# Patient Record
Sex: FEMALE | Race: WHITE | Employment: FULL TIME | URBAN - METROPOLITAN AREA
[De-identification: names, ages, dates, MRNs, and addresses within clinical notes are randomized per-mention and may not be internally consistent; named-entity substitution may affect disease eponyms.]

---

## 2017-01-19 ENCOUNTER — HOSPITAL ENCOUNTER (OUTPATIENT)
Dept: VASCULAR LAB | Age: 47
Discharge: HOME | End: 2017-01-19
Attending: INTERNAL MEDICINE | Admitting: INTERNAL MEDICINE

## 2017-01-19 DIAGNOSIS — I82.621 ACUTE DEEP VEIN THROMBOSIS (DVT) OF RIGHT UPPER EXTREMITY, UNSPECIFIED VEIN (HCC): Primary | ICD-10-CM

## 2017-01-19 DIAGNOSIS — I82.621 ACUTE EMBOLISM AND THROMBOSIS OF DEEP VEIN OF RIGHT UPPER EXTREMITY (HCC): ICD-10-CM

## 2017-03-24 ENCOUNTER — HOSPITAL ENCOUNTER (OUTPATIENT)
Dept: CT IMAGING | Age: 47
Discharge: HOME | End: 2017-03-24

## 2017-03-24 ENCOUNTER — HOSPITAL ENCOUNTER (OUTPATIENT)
Dept: OTHER | Age: 47
Discharge: HOME | End: 2017-03-24

## 2017-03-24 DIAGNOSIS — J40 BRONCHITIS, NOT SPECIFIED AS ACUTE OR CHRONIC: ICD-10-CM

## 2017-03-24 DIAGNOSIS — R91.8 LUNG MASS: ICD-10-CM

## 2017-03-24 DIAGNOSIS — R91.8 OTHER NONSPECIFIC ABNORMAL FINDING OF LUNG FIELD: ICD-10-CM

## 2017-03-24 DIAGNOSIS — R05.9 COUGH: ICD-10-CM

## 2017-03-24 DIAGNOSIS — R09.89 ABNORMAL CHEST SOUNDS: ICD-10-CM

## 2017-03-27 ENCOUNTER — TELEPHONE (OUTPATIENT)
Dept: PULMONOLOGY | Age: 47
End: 2017-03-27

## 2017-03-29 ENCOUNTER — OFFICE VISIT (OUTPATIENT)
Dept: PULMONOLOGY | Age: 47
End: 2017-03-29

## 2017-03-29 VITALS
HEIGHT: 67 IN | HEART RATE: 85 BPM | WEIGHT: 234 LBS | OXYGEN SATURATION: 96 % | DIASTOLIC BLOOD PRESSURE: 72 MMHG | SYSTOLIC BLOOD PRESSURE: 112 MMHG | BODY MASS INDEX: 36.73 KG/M2 | RESPIRATION RATE: 16 BRPM

## 2017-03-29 DIAGNOSIS — C81.92 HODGKIN LYMPHOMA OF INTRATHORACIC LYMPH NODES, UNSPECIFIED HODGKIN LYMPHOMA TYPE (HCC): ICD-10-CM

## 2017-03-29 DIAGNOSIS — C92.01 ACUTE MYELOID LEUKEMIA IN REMISSION (HCC): ICD-10-CM

## 2017-03-29 DIAGNOSIS — Z94.81 HISTORY OF BONE MARROW TRANSPLANT (HCC): ICD-10-CM

## 2017-03-29 DIAGNOSIS — R91.8 MASS OF UPPER LOBE OF LUNG: Primary | ICD-10-CM

## 2017-03-29 PROCEDURE — 99204 OFFICE O/P NEW MOD 45 MIN: CPT | Performed by: INTERNAL MEDICINE

## 2017-03-29 RX ORDER — AMOXICILLIN AND CLAVULANATE POTASSIUM 875; 125 MG/1; MG/1
TABLET, FILM COATED ORAL
COMMUNITY
Start: 2017-03-22 | End: 2017-04-12

## 2017-03-29 RX ORDER — PREDNISONE 10 MG/1
TABLET ORAL
COMMUNITY
Start: 2017-03-22 | End: 2017-04-12

## 2017-04-03 ENCOUNTER — HOSPITAL ENCOUNTER (OUTPATIENT)
Dept: ENDOSCOPY | Age: 47
Discharge: HOME | End: 2017-04-03
Attending: INTERNAL MEDICINE | Admitting: INTERNAL MEDICINE

## 2017-04-03 VITALS
WEIGHT: 230 LBS | TEMPERATURE: 98.2 F | DIASTOLIC BLOOD PRESSURE: 69 MMHG | HEIGHT: 67 IN | BODY MASS INDEX: 36.1 KG/M2 | SYSTOLIC BLOOD PRESSURE: 115 MMHG | OXYGEN SATURATION: 98 % | RESPIRATION RATE: 16 BRPM | HEART RATE: 91 BPM

## 2017-04-03 PROCEDURE — 31624 DX BRONCHOSCOPE/LAVAGE: CPT | Performed by: INTERNAL MEDICINE

## 2017-04-05 LAB
CULTURE, RESPIRATORY: NORMAL
GRAM STAIN RESULT: NORMAL

## 2017-04-12 ENCOUNTER — OFFICE VISIT (OUTPATIENT)
Dept: PULMONOLOGY | Age: 47
End: 2017-04-12

## 2017-04-12 ENCOUNTER — TELEPHONE (OUTPATIENT)
Dept: PULMONOLOGY | Age: 47
End: 2017-04-12

## 2017-04-12 VITALS
BODY MASS INDEX: 36.88 KG/M2 | WEIGHT: 235 LBS | OXYGEN SATURATION: 96 % | RESPIRATION RATE: 16 BRPM | HEART RATE: 92 BPM | SYSTOLIC BLOOD PRESSURE: 116 MMHG | HEIGHT: 67 IN | DIASTOLIC BLOOD PRESSURE: 78 MMHG

## 2017-04-12 DIAGNOSIS — R91.8 MASS OF UPPER LOBE OF LUNG: Primary | ICD-10-CM

## 2017-04-12 PROCEDURE — 99213 OFFICE O/P EST LOW 20 MIN: CPT | Performed by: INTERNAL MEDICINE

## 2017-04-18 ENCOUNTER — HOSPITAL ENCOUNTER (OUTPATIENT)
Dept: OTHER | Age: 47
Discharge: HOME | End: 2017-04-18
Attending: INTERNAL MEDICINE | Admitting: RADIOLOGY

## 2017-04-18 VITALS
WEIGHT: 230 LBS | OXYGEN SATURATION: 96 % | DIASTOLIC BLOOD PRESSURE: 64 MMHG | TEMPERATURE: 97.6 F | HEART RATE: 100 BPM | BODY MASS INDEX: 36.1 KG/M2 | RESPIRATION RATE: 14 BRPM | SYSTOLIC BLOOD PRESSURE: 99 MMHG | HEIGHT: 67 IN

## 2017-04-18 DIAGNOSIS — R91.8 MASS OF UPPER LOBE OF LUNG: ICD-10-CM

## 2017-04-18 LAB
APTT: 27.9 SEC (ref 21–31.8)
INR BLD: 1.04 (ref 0.85–1.15)
PLATELET # BLD: 278 K/UL (ref 135–450)
PROTHROMBIN TIME: 11.7 SEC (ref 9.6–13)

## 2017-04-18 RX ORDER — MIDAZOLAM HYDROCHLORIDE 1 MG/ML
1 INJECTION INTRAMUSCULAR; INTRAVENOUS EVERY 5 MIN PRN
Status: COMPLETED | OUTPATIENT
Start: 2017-04-18 | End: 2017-04-18

## 2017-04-18 RX ORDER — FENTANYL CITRATE 50 UG/ML
50 INJECTION, SOLUTION INTRAMUSCULAR; INTRAVENOUS EVERY 5 MIN PRN
Status: DISCONTINUED | OUTPATIENT
Start: 2017-04-18 | End: 2017-04-20 | Stop reason: HOSPADM

## 2017-04-18 RX ORDER — ACETAMINOPHEN 325 MG/1
650 TABLET ORAL EVERY 4 HOURS PRN
Status: DISCONTINUED | OUTPATIENT
Start: 2017-04-18 | End: 2017-04-18

## 2017-04-18 RX ORDER — ONDANSETRON 2 MG/ML
4 INJECTION INTRAMUSCULAR; INTRAVENOUS EVERY 8 HOURS PRN
Status: DISCONTINUED | OUTPATIENT
Start: 2017-04-18 | End: 2017-04-18

## 2017-04-18 RX ADMIN — MIDAZOLAM HYDROCHLORIDE 1 MG: 1 INJECTION INTRAMUSCULAR; INTRAVENOUS at 11:50

## 2017-04-18 RX ADMIN — FENTANYL CITRATE 50 MCG: 50 INJECTION, SOLUTION INTRAMUSCULAR; INTRAVENOUS at 12:05

## 2017-04-18 RX ADMIN — MIDAZOLAM HYDROCHLORIDE 1 MG: 1 INJECTION INTRAMUSCULAR; INTRAVENOUS at 11:30

## 2017-04-18 RX ADMIN — FENTANYL CITRATE 25 MCG: 50 INJECTION, SOLUTION INTRAMUSCULAR; INTRAVENOUS at 11:50

## 2017-04-18 RX ADMIN — FENTANYL CITRATE 50 MCG: 50 INJECTION, SOLUTION INTRAMUSCULAR; INTRAVENOUS at 11:30

## 2017-04-18 ASSESSMENT — PAIN DESCRIPTION - FREQUENCY
FREQUENCY: CONTINUOUS
FREQUENCY: INTERMITTENT

## 2017-04-18 ASSESSMENT — PAIN DESCRIPTION - LOCATION
LOCATION: CHEST

## 2017-04-18 ASSESSMENT — PAIN DESCRIPTION - PAIN TYPE
TYPE: ACUTE PAIN

## 2017-04-18 ASSESSMENT — PAIN SCALES - GENERAL
PAINLEVEL_OUTOF10: 5
PAINLEVEL_OUTOF10: 0
PAINLEVEL_OUTOF10: 3
PAINLEVEL_OUTOF10: 3
PAINLEVEL_OUTOF10: 0
PAINLEVEL_OUTOF10: 4
PAINLEVEL_OUTOF10: 6
PAINLEVEL_OUTOF10: 1

## 2017-04-18 ASSESSMENT — PAIN DESCRIPTION - DESCRIPTORS
DESCRIPTORS: ACHING
DESCRIPTORS: DULL
DESCRIPTORS: ACHING;SHARP
DESCRIPTORS: SHARP

## 2017-04-18 ASSESSMENT — PAIN DESCRIPTION - ONSET
ONSET: ON-GOING

## 2017-04-18 ASSESSMENT — PAIN DESCRIPTION - DIRECTION: RADIATING_TOWARDS: UNDER LEFT BREAST

## 2017-04-18 ASSESSMENT — PAIN DESCRIPTION - PROGRESSION
CLINICAL_PROGRESSION: GRADUALLY IMPROVING
CLINICAL_PROGRESSION: NOT CHANGED

## 2017-04-18 ASSESSMENT — PAIN DESCRIPTION - ORIENTATION
ORIENTATION: MID;RIGHT
ORIENTATION: MID;LEFT
ORIENTATION: MID
ORIENTATION: MID;LEFT

## 2017-04-19 PROBLEM — S27.892A MEDIASTINAL HEMATOMA: Status: ACTIVE | Noted: 2017-04-19

## 2017-04-21 LAB
BODY FLUID CULTURE, STERILE: NORMAL
GRAM STAIN RESULT: NORMAL

## 2017-04-23 LAB — ANAEROBIC CULTURE: NORMAL

## 2017-05-08 LAB
FUNGUS (MYCOLOGY) CULTURE: NORMAL
FUNGUS STAIN: NORMAL

## 2017-05-09 ENCOUNTER — TELEPHONE (OUTPATIENT)
Dept: PULMONOLOGY | Age: 47
End: 2017-05-09

## 2017-05-09 DIAGNOSIS — R91.8 LUNG MASS: Primary | ICD-10-CM

## 2017-05-22 LAB
FUNGUS (MYCOLOGY) CULTURE: NORMAL
FUNGUS STAIN: NORMAL

## 2017-05-23 LAB
AFB CULTURE (MYCOBACTERIA): NORMAL
AFB SMEAR: NORMAL

## 2017-05-26 ENCOUNTER — HOSPITAL ENCOUNTER (OUTPATIENT)
Dept: CT IMAGING | Age: 47
Discharge: HOME | End: 2017-05-26
Attending: INTERNAL MEDICINE | Admitting: INTERNAL MEDICINE

## 2017-05-26 DIAGNOSIS — R91.8 LUNG MASS: ICD-10-CM

## 2017-05-26 DIAGNOSIS — R91.8 OTHER NONSPECIFIC ABNORMAL FINDING OF LUNG FIELD: ICD-10-CM

## 2017-06-05 ENCOUNTER — TELEPHONE (OUTPATIENT)
Dept: PULMONOLOGY | Age: 47
End: 2017-06-05

## 2017-06-06 LAB
AFB CULTURE (MYCOBACTERIA): NORMAL
AFB SMEAR: NORMAL

## 2017-06-12 ENCOUNTER — TELEPHONE (OUTPATIENT)
Dept: PULMONOLOGY | Age: 47
End: 2017-06-12

## 2017-06-21 ENCOUNTER — OFFICE VISIT (OUTPATIENT)
Dept: CARDIOTHORACIC SURGERY | Age: 47
End: 2017-06-21

## 2017-06-21 VITALS
BODY MASS INDEX: 36.38 KG/M2 | OXYGEN SATURATION: 96 % | DIASTOLIC BLOOD PRESSURE: 66 MMHG | SYSTOLIC BLOOD PRESSURE: 118 MMHG | TEMPERATURE: 98.3 F | HEART RATE: 96 BPM | WEIGHT: 231.8 LBS | HEIGHT: 67 IN

## 2017-06-21 DIAGNOSIS — R91.8 MASS OF UPPER LOBE OF LEFT LUNG: Primary | ICD-10-CM

## 2017-06-21 DIAGNOSIS — C81.90 HODGKIN'S DISEASE IN REMISSION (HCC): ICD-10-CM

## 2017-06-21 DIAGNOSIS — C93.01 AML M5 (ACUTE MONOCYTIC LEUKEMIA) IN REMISSION (HCC): ICD-10-CM

## 2017-06-21 DIAGNOSIS — E66.9 OBESITY (BMI 35.0-39.9 WITHOUT COMORBIDITY): ICD-10-CM

## 2017-06-21 PROCEDURE — 99244 OFF/OP CNSLTJ NEW/EST MOD 40: CPT | Performed by: THORACIC SURGERY (CARDIOTHORACIC VASCULAR SURGERY)

## 2017-06-21 ASSESSMENT — ENCOUNTER SYMPTOMS
CHEST TIGHTNESS: 0
BACK PAIN: 0
EYE REDNESS: 0
ABDOMINAL DISTENTION: 0
GASTROINTESTINAL NEGATIVE: 1
EYE ITCHING: 0
ABDOMINAL PAIN: 0
COUGH: 1
TROUBLE SWALLOWING: 0
WHEEZING: 0
SHORTNESS OF BREATH: 0
BLOOD IN STOOL: 0
VOICE CHANGE: 0
EYE PAIN: 0

## 2017-06-22 ENCOUNTER — TELEPHONE (OUTPATIENT)
Dept: CARDIOTHORACIC SURGERY | Age: 47
End: 2017-06-22

## 2017-06-30 ENCOUNTER — TELEPHONE (OUTPATIENT)
Dept: CARDIOTHORACIC SURGERY | Age: 47
End: 2017-06-30

## 2017-06-30 ENCOUNTER — TELEPHONE (OUTPATIENT)
Dept: PULMONOLOGY | Age: 47
End: 2017-06-30

## 2017-07-05 ENCOUNTER — HOSPITAL ENCOUNTER (OUTPATIENT)
Dept: CT IMAGING | Age: 47
Discharge: HOME | End: 2017-07-05
Attending: THORACIC SURGERY (CARDIOTHORACIC VASCULAR SURGERY) | Admitting: THORACIC SURGERY (CARDIOTHORACIC VASCULAR SURGERY)

## 2017-07-05 ENCOUNTER — HOSPITAL ENCOUNTER (OUTPATIENT)
Dept: PREADMISSION TESTING | Age: 47
Discharge: HOME | End: 2017-07-05
Attending: THORACIC SURGERY (CARDIOTHORACIC VASCULAR SURGERY) | Admitting: THORACIC SURGERY (CARDIOTHORACIC VASCULAR SURGERY)

## 2017-07-05 ENCOUNTER — TELEPHONE (OUTPATIENT)
Dept: CARDIOTHORACIC SURGERY | Age: 47
End: 2017-07-05

## 2017-07-05 VITALS
OXYGEN SATURATION: 96 % | HEIGHT: 67 IN | TEMPERATURE: 98 F | HEART RATE: 94 BPM | SYSTOLIC BLOOD PRESSURE: 97 MMHG | DIASTOLIC BLOOD PRESSURE: 62 MMHG | WEIGHT: 233 LBS | BODY MASS INDEX: 36.57 KG/M2 | RESPIRATION RATE: 16 BRPM

## 2017-07-05 DIAGNOSIS — R91.8 LUNG MASS: ICD-10-CM

## 2017-07-05 DIAGNOSIS — R91.8 OTHER NONSPECIFIC ABNORMAL FINDING OF LUNG FIELD: ICD-10-CM

## 2017-07-05 LAB
A/G RATIO: 1.3 (ref 1.1–2.2)
ABO/RH: NORMAL
ALBUMIN SERPL-MCNC: 4.2 G/DL (ref 3.4–5)
ALP BLD-CCNC: 105 U/L (ref 40–129)
ALT SERPL-CCNC: 23 U/L (ref 10–40)
AMORPHOUS: ABNORMAL /HPF
ANION GAP SERPL CALCULATED.3IONS-SCNC: 11 MMOL/L (ref 3–16)
ANTIBODY SCREEN: NORMAL
APTT: 29.7 SEC (ref 21–31.8)
AST SERPL-CCNC: 27 U/L (ref 15–37)
BILIRUB SERPL-MCNC: <0.2 MG/DL (ref 0–1)
BILIRUBIN URINE: NEGATIVE
BLOOD, URINE: NEGATIVE
BUN BLDV-MCNC: 12 MG/DL (ref 7–20)
CALCIUM SERPL-MCNC: 9.5 MG/DL (ref 8.3–10.6)
CHLORIDE BLD-SCNC: 104 MMOL/L (ref 99–110)
CLARITY: CLEAR
CO2: 25 MMOL/L (ref 21–32)
COLOR: YELLOW
CREAT SERPL-MCNC: 0.7 MG/DL (ref 0.6–1.1)
EKG ATRIAL RATE: 83 BPM
EKG DIAGNOSIS: NORMAL
EKG P AXIS: 73 DEGREES
EKG P-R INTERVAL: 164 MS
EKG Q-T INTERVAL: 384 MS
EKG QRS DURATION: 90 MS
EKG QTC CALCULATION (BAZETT): 451 MS
EKG R AXIS: 43 DEGREES
EKG T AXIS: 71 DEGREES
EKG VENTRICULAR RATE: 83 BPM
EPITHELIAL CELLS, UA: ABNORMAL /HPF
GFR AFRICAN AMERICAN: >60
GFR NON-AFRICAN AMERICAN: >60
GLOBULIN: 3.3 G/DL
GLUCOSE BLD-MCNC: 112 MG/DL (ref 70–99)
GLUCOSE URINE: NEGATIVE MG/DL
HCT VFR BLD CALC: 41.2 % (ref 36–48)
HEMOGLOBIN: 13.8 G/DL (ref 12–16)
INR BLD: 1.28 (ref 0.85–1.15)
KETONES, URINE: NEGATIVE MG/DL
LEUKOCYTE ESTERASE, URINE: ABNORMAL
MAGNESIUM: 2.4 MG/DL (ref 1.8–2.4)
MCH RBC QN AUTO: 29.4 PG (ref 26–34)
MCHC RBC AUTO-ENTMCNC: 33.6 G/DL (ref 31–36)
MCV RBC AUTO: 87.7 FL (ref 80–100)
MICROSCOPIC EXAMINATION: YES
NITRITE, URINE: NEGATIVE
PDW BLD-RTO: 14.2 % (ref 12.4–15.4)
PH UA: 6
PLATELET # BLD: 242 K/UL (ref 135–450)
PMV BLD AUTO: 7.2 FL (ref 5–10.5)
POTASSIUM SERPL-SCNC: 4.4 MMOL/L (ref 3.5–5.1)
PROTEIN UA: ABNORMAL MG/DL
PROTHROMBIN TIME: 14.5 SEC (ref 9.6–13)
RBC # BLD: 4.7 M/UL (ref 4–5.2)
RBC UA: ABNORMAL /HPF (ref 0–2)
SODIUM BLD-SCNC: 140 MMOL/L (ref 136–145)
SPECIFIC GRAVITY UA: >=1.03
TOTAL PROTEIN: 7.5 G/DL (ref 6.4–8.2)
URINE TYPE: ABNORMAL
UROBILINOGEN, URINE: 0.2 E.U./DL
WBC # BLD: 6.6 K/UL (ref 4–11)
WBC UA: ABNORMAL /HPF (ref 0–5)

## 2017-07-05 PROCEDURE — 93010 ELECTROCARDIOGRAM REPORT: CPT | Performed by: INTERNAL MEDICINE

## 2017-07-05 RX ORDER — SODIUM CHLORIDE 0.9 % (FLUSH) 0.9 %
10 SYRINGE (ML) INJECTION PRN
Status: CANCELLED | OUTPATIENT
Start: 2017-07-12

## 2017-07-05 RX ORDER — SODIUM CHLORIDE, SODIUM LACTATE, POTASSIUM CHLORIDE, CALCIUM CHLORIDE 600; 310; 30; 20 MG/100ML; MG/100ML; MG/100ML; MG/100ML
INJECTION, SOLUTION INTRAVENOUS CONTINUOUS
Status: CANCELLED | OUTPATIENT
Start: 2017-07-12

## 2017-07-05 RX ORDER — SODIUM CHLORIDE 0.9 % (FLUSH) 0.9 %
10 SYRINGE (ML) INJECTION EVERY 12 HOURS SCHEDULED
Status: CANCELLED | OUTPATIENT
Start: 2017-07-12

## 2017-07-07 LAB — URINE CULTURE, ROUTINE: NORMAL

## 2017-07-11 ENCOUNTER — OFFICE VISIT (OUTPATIENT)
Dept: ORTHOPEDIC SURGERY | Age: 47
End: 2017-07-11

## 2017-07-11 VITALS — WEIGHT: 233 LBS | BODY MASS INDEX: 36.57 KG/M2 | TEMPERATURE: 97.2 F | HEIGHT: 67 IN

## 2017-07-11 DIAGNOSIS — S90.122A: Primary | ICD-10-CM

## 2017-07-11 DIAGNOSIS — M20.22 HALLUX RIGIDUS OF LEFT FOOT: ICD-10-CM

## 2017-07-11 PROCEDURE — 99204 OFFICE O/P NEW MOD 45 MIN: CPT | Performed by: ORTHOPAEDIC SURGERY

## 2017-07-13 LAB
GLUCOSE BLD-MCNC: 135 MG/DL (ref 70–99)
PERFORMED ON: ABNORMAL

## 2017-07-17 LAB
BACTERIA: ABNORMAL /HPF
EPITHELIAL CELLS, UA: ABNORMAL /HPF
RBC UA: ABNORMAL /HPF (ref 0–2)
WBC UA: ABNORMAL /HPF (ref 0–5)

## 2017-07-18 LAB
ANAEROBIC CULTURE: NORMAL
ANAEROBIC CULTURE: NORMAL
CULTURE SURGICAL: NORMAL
CULTURE SURGICAL: NORMAL
GRAM STAIN RESULT: NORMAL
GRAM STAIN RESULT: NORMAL

## 2017-07-20 ENCOUNTER — TELEPHONE (OUTPATIENT)
Dept: INFECTIOUS DISEASES | Age: 47
End: 2017-07-20

## 2017-07-20 PROBLEM — A31.0 PULMONARY MYCOBACTERIAL INFECTION (HCC): Status: ACTIVE | Noted: 2017-07-20

## 2017-07-21 ENCOUNTER — TELEPHONE (OUTPATIENT)
Dept: CARDIOTHORACIC SURGERY | Age: 47
End: 2017-07-21

## 2017-07-24 ENCOUNTER — TELEPHONE (OUTPATIENT)
Dept: CARDIOTHORACIC SURGERY | Age: 47
End: 2017-07-24

## 2017-07-25 ENCOUNTER — HOSPITAL ENCOUNTER (OUTPATIENT)
Dept: OTHER | Age: 47
Discharge: HOME | End: 2017-07-25
Attending: THORACIC SURGERY (CARDIOTHORACIC VASCULAR SURGERY) | Admitting: THORACIC SURGERY (CARDIOTHORACIC VASCULAR SURGERY)

## 2017-07-25 DIAGNOSIS — R91.8 MASS OF UPPER LOBE OF LUNG: ICD-10-CM

## 2017-07-26 ENCOUNTER — OFFICE VISIT (OUTPATIENT)
Dept: CARDIOTHORACIC SURGERY | Age: 47
End: 2017-07-26

## 2017-07-26 VITALS
WEIGHT: 230 LBS | OXYGEN SATURATION: 97 % | TEMPERATURE: 98.7 F | DIASTOLIC BLOOD PRESSURE: 66 MMHG | BODY MASS INDEX: 36.1 KG/M2 | HEIGHT: 67 IN | SYSTOLIC BLOOD PRESSURE: 114 MMHG | HEART RATE: 108 BPM

## 2017-07-26 DIAGNOSIS — Z09 FOLLOW-UP EXAMINATION FOLLOWING SURGERY: Primary | ICD-10-CM

## 2017-07-26 PROCEDURE — 99024 POSTOP FOLLOW-UP VISIT: CPT | Performed by: THORACIC SURGERY (CARDIOTHORACIC VASCULAR SURGERY)

## 2017-08-03 ENCOUNTER — OFFICE VISIT (OUTPATIENT)
Dept: PULMONOLOGY | Age: 47
End: 2017-08-03

## 2017-08-03 VITALS
RESPIRATION RATE: 18 BRPM | SYSTOLIC BLOOD PRESSURE: 122 MMHG | HEIGHT: 67 IN | WEIGHT: 232 LBS | DIASTOLIC BLOOD PRESSURE: 80 MMHG | OXYGEN SATURATION: 98 % | HEART RATE: 115 BPM | BODY MASS INDEX: 36.41 KG/M2

## 2017-08-03 DIAGNOSIS — A31.0 PULMONARY MYCOBACTERIAL INFECTION (HCC): Primary | ICD-10-CM

## 2017-08-03 DIAGNOSIS — C92.01 ACUTE MYELOID LEUKEMIA IN REMISSION (HCC): ICD-10-CM

## 2017-08-03 PROBLEM — S27.892A MEDIASTINAL HEMATOMA: Status: RESOLVED | Noted: 2017-04-19 | Resolved: 2017-08-03

## 2017-08-03 PROBLEM — R91.8 MASS OF UPPER LOBE OF LUNG: Status: RESOLVED | Noted: 2017-03-29 | Resolved: 2017-08-03

## 2017-08-03 PROCEDURE — 99214 OFFICE O/P EST MOD 30 MIN: CPT | Performed by: INTERNAL MEDICINE

## 2017-08-12 ENCOUNTER — TELEPHONE (OUTPATIENT)
Dept: CARDIOTHORACIC SURGERY | Age: 47
End: 2017-08-12

## 2017-08-14 ENCOUNTER — TELEPHONE (OUTPATIENT)
Dept: CARDIOTHORACIC SURGERY | Age: 47
End: 2017-08-14

## 2017-08-14 DIAGNOSIS — Z86.19 HISTORY OF SHINGLES: ICD-10-CM

## 2017-08-14 PROBLEM — R05.3 CHRONIC COUGH: Status: ACTIVE | Noted: 2017-03-22

## 2017-08-14 LAB
FUNGUS (MYCOLOGY) CULTURE: NORMAL
FUNGUS STAIN: NORMAL

## 2017-08-17 ENCOUNTER — OFFICE VISIT (OUTPATIENT)
Dept: INFECTIOUS DISEASES | Age: 47
End: 2017-08-17

## 2017-08-17 VITALS
SYSTOLIC BLOOD PRESSURE: 110 MMHG | OXYGEN SATURATION: 98 % | TEMPERATURE: 102.3 F | HEART RATE: 132 BPM | DIASTOLIC BLOOD PRESSURE: 60 MMHG

## 2017-08-17 DIAGNOSIS — R50.9 FEVER, UNSPECIFIED FEVER CAUSE: ICD-10-CM

## 2017-08-17 DIAGNOSIS — A31.0 PULMONARY MYCOBACTERIAL INFECTION (HCC): Primary | ICD-10-CM

## 2017-08-17 DIAGNOSIS — R51.9 INTRACTABLE HEADACHE, UNSPECIFIED CHRONICITY PATTERN, UNSPECIFIED HEADACHE TYPE: ICD-10-CM

## 2017-08-17 PROCEDURE — 99215 OFFICE O/P EST HI 40 MIN: CPT | Performed by: INTERNAL MEDICINE

## 2017-08-18 ENCOUNTER — TELEPHONE (OUTPATIENT)
Dept: INFECTIOUS DISEASES | Age: 47
End: 2017-08-18

## 2017-08-22 ENCOUNTER — TELEPHONE (OUTPATIENT)
Dept: INFECTIOUS DISEASES | Age: 47
End: 2017-08-22

## 2017-08-25 DIAGNOSIS — A31.0 PULMONARY MYCOBACTERIAL INFECTION (HCC): Primary | ICD-10-CM

## 2017-08-25 RX ORDER — ETHAMBUTOL HYDROCHLORIDE 400 MG/1
TABLET, FILM COATED ORAL
Qty: 12 TABLET | Refills: 3 | Status: SHIPPED | OUTPATIENT
Start: 2017-08-25 | End: 2017-08-25 | Stop reason: CLARIF

## 2017-08-25 RX ORDER — AZITHROMYCIN 500 MG/1
500 TABLET, FILM COATED ORAL
Qty: 12 TABLET | Refills: 3 | Status: SHIPPED | OUTPATIENT
Start: 2017-08-25 | End: 2017-08-25 | Stop reason: CLARIF

## 2017-08-31 ENCOUNTER — OFFICE VISIT (OUTPATIENT)
Dept: INFECTIOUS DISEASES | Age: 47
End: 2017-08-31

## 2017-08-31 VITALS
BODY MASS INDEX: 36.02 KG/M2 | WEIGHT: 230 LBS | TEMPERATURE: 98.5 F | DIASTOLIC BLOOD PRESSURE: 78 MMHG | SYSTOLIC BLOOD PRESSURE: 116 MMHG

## 2017-08-31 DIAGNOSIS — A31.0 PULMONARY MYCOBACTERIAL INFECTION (HCC): Primary | ICD-10-CM

## 2017-08-31 PROCEDURE — 99215 OFFICE O/P EST HI 40 MIN: CPT | Performed by: INTERNAL MEDICINE

## 2017-09-07 ENCOUNTER — OFFICE VISIT (OUTPATIENT)
Dept: PULMONOLOGY | Age: 47
End: 2017-09-07

## 2017-09-07 VITALS
HEART RATE: 96 BPM | DIASTOLIC BLOOD PRESSURE: 72 MMHG | BODY MASS INDEX: 36.63 KG/M2 | RESPIRATION RATE: 18 BRPM | OXYGEN SATURATION: 98 % | HEIGHT: 67 IN | SYSTOLIC BLOOD PRESSURE: 116 MMHG | WEIGHT: 233.4 LBS

## 2017-09-07 DIAGNOSIS — C92.01 ACUTE MYELOID LEUKEMIA IN REMISSION (HCC): ICD-10-CM

## 2017-09-07 DIAGNOSIS — R05.9 COUGH: ICD-10-CM

## 2017-09-07 DIAGNOSIS — A31.0 PULMONARY MYCOBACTERIAL INFECTION (HCC): Primary | ICD-10-CM

## 2017-09-07 PROCEDURE — 99214 OFFICE O/P EST MOD 30 MIN: CPT | Performed by: INTERNAL MEDICINE

## 2017-09-07 RX ORDER — AZITHROMYCIN 500 MG/1
500 TABLET, FILM COATED ORAL
COMMUNITY
End: 2017-11-20 | Stop reason: SDUPTHER

## 2017-09-26 LAB
AFB CULTURE (MYCOBACTERIA): ABNORMAL
AFB CULTURE (MYCOBACTERIA): ABNORMAL
AFB SMEAR: ABNORMAL
ORGANISM: ABNORMAL

## 2017-09-27 ENCOUNTER — OFFICE VISIT (OUTPATIENT)
Dept: INFECTIOUS DISEASES | Age: 47
End: 2017-09-27

## 2017-09-27 VITALS
SYSTOLIC BLOOD PRESSURE: 112 MMHG | TEMPERATURE: 98.3 F | BODY MASS INDEX: 36.88 KG/M2 | HEIGHT: 67 IN | WEIGHT: 235 LBS | DIASTOLIC BLOOD PRESSURE: 66 MMHG

## 2017-09-27 DIAGNOSIS — A31.0 PULMONARY MYCOBACTERIAL INFECTION (HCC): Primary | ICD-10-CM

## 2017-09-27 PROCEDURE — 99215 OFFICE O/P EST HI 40 MIN: CPT | Performed by: INTERNAL MEDICINE

## 2017-10-20 ENCOUNTER — OFFICE VISIT (OUTPATIENT)
Dept: PULMONOLOGY | Age: 47
End: 2017-10-20

## 2017-10-20 VITALS
SYSTOLIC BLOOD PRESSURE: 114 MMHG | WEIGHT: 232 LBS | DIASTOLIC BLOOD PRESSURE: 78 MMHG | HEIGHT: 67 IN | OXYGEN SATURATION: 97 % | BODY MASS INDEX: 36.41 KG/M2 | HEART RATE: 110 BPM | RESPIRATION RATE: 16 BRPM

## 2017-10-20 DIAGNOSIS — A31.0 PULMONARY MYCOBACTERIAL INFECTION (HCC): Primary | ICD-10-CM

## 2017-10-20 DIAGNOSIS — C92.01 ACUTE MYELOID LEUKEMIA IN REMISSION (HCC): ICD-10-CM

## 2017-10-20 PROCEDURE — 99214 OFFICE O/P EST MOD 30 MIN: CPT | Performed by: INTERNAL MEDICINE

## 2017-10-20 NOTE — PROGRESS NOTES
Subjective:      Patient ID: Paco Catherine is a 56 y.o. female. HPI  Ms. Jaylyn Martin returns for regular follow-up for pulmonary mycobacterial infection. She had undergone thoracotomy for removal of a left upper lobe mass lesion, had a lobectomy required. 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 She is recovering well from surgery. She has no chest discomfort. She still has some shortness of breath when she bends over to  something from the floor. She has not engaged in a program of regular exercise, although tries to increase her steps every day. With usual activities including walking, she has no shortness of breath or other limitation. She has observed that her cough has decreased significantly. In fact, she hardly ever thinks about it. She has some intermittent symptoms of nasal congestion and drainage, does not take antihistamine for this. It seems not to be that much of a bother. She has had no more occurrences of fevers since stopping rifampin. She continues on azithromycin and ethambutol 3 days a week. She is also taking a probiotic on those days to limit diarrhea. She has had a few episodes of apparent dumping. No abdominal pain. No nausea or vomiting. Weight is unchanged    Review of Systems    Objective:   Physical Exam   Constitutional: She is oriented to person, place, and time. She appears well-developed and well-nourished. HENT:   Head: Normocephalic and atraumatic. Mouth/Throat: Oropharynx is clear and moist and mucous membranes are normal. No oropharyngeal exudate, posterior oropharyngeal edema or posterior oropharyngeal erythema. Minimal cobblestone appearance of the oral pharyngeal mucosa   Eyes: Right eye exhibits no discharge. Left eye exhibits no discharge. No scleral icterus. Neck: No tracheal deviation present. No thyromegaly present. Cardiovascular: Normal rate, regular rhythm, normal heart sounds and intact distal pulses.     No

## 2017-11-20 ENCOUNTER — OFFICE VISIT (OUTPATIENT)
Dept: INFECTIOUS DISEASES | Age: 47
End: 2017-11-20

## 2017-11-20 VITALS
DIASTOLIC BLOOD PRESSURE: 70 MMHG | TEMPERATURE: 98.6 F | SYSTOLIC BLOOD PRESSURE: 116 MMHG | WEIGHT: 232 LBS | BODY MASS INDEX: 36.41 KG/M2 | HEIGHT: 67 IN

## 2017-11-20 DIAGNOSIS — A31.0 PULMONARY MYCOBACTERIAL INFECTION (HCC): Primary | ICD-10-CM

## 2017-11-20 PROCEDURE — 99215 OFFICE O/P EST HI 40 MIN: CPT | Performed by: INTERNAL MEDICINE

## 2017-11-20 RX ORDER — ETHAMBUTOL HYDROCHLORIDE 400 MG/1
2000 TABLET, FILM COATED ORAL
Qty: 60 TABLET | Refills: 4 | Status: SHIPPED | OUTPATIENT
Start: 2017-11-20 | End: 2017-12-20

## 2017-11-20 RX ORDER — AZITHROMYCIN 500 MG/1
500 TABLET, FILM COATED ORAL
Qty: 12 TABLET | Refills: 3 | Status: SHIPPED | OUTPATIENT
Start: 2017-11-20 | End: 2017-12-20

## 2017-11-20 NOTE — PROGRESS NOTES
swelling or pain. No myalgia, arthralgia. Denies skin change, rash, itching  Denies focal weakness, sensory change   Denies new depression or other psychiatric problem. No symptoms endocrine disorder. No symptoms hematologic disorder. PHYSICAL EXAM:    Vitals:    /70   Temp 98.6 °F (37 °C) (Oral)   Ht 5' 7\" (1.702 m)   Wt 232 lb (105.2 kg)   LMP 2016   BMI 36.34 kg/m²     GENERAL: No apparent distress. HEENT: Membranes moist, no oral lesion  NECK:  Supple  LYMPH: No adenopathy   LUNGS: Clear b/l, no rales, no dullness - L lateral incision and CT site healed with no redness / swelling  CARDIAC: RRR, no murmur appreciated  ABD:  + BS, soft / NT  EXT:  No rash, no edema, no lesions  NEURO: No focal neurologic findings  PSYCH: Orientation, sensorium, mood normal      DATA:    See EPIC    Micro:  7/15 Lung: bacterial cultures (3) - no growth    7/15 Lung: Mycobacterial cult: AFB stain neg; culture + M. avium/intracellilare/scrofulaceum    7/15 Lung: Fungal cult: fungal stain neg; cultures - no growth to date      Imagin/5 Chest CT:   Large triangular-shaped mass in the left upper lobe unchanged from   the prior examination abutting the mediastinum medially.        Chest CT:  Increase in size of the medial and superior portions of the left   upper lobe mass. However, there is also improvement in the degree   of mediastinal invasion as detailed above.        No visualized lymphadenopathy, noting limitation by lack of   intravenous contrast.       3/24 Chest CT:  1. There is a lobulated area of consolidation identified within   the anterior segment of the left upper lobe. There is associated   occlusion of the anterior segment of the left upper lobe bronchus   just proximal to this area of lobulated consolidation. This may   reflect a chronically obstructed left upper lobe bronchus with   distal mucus plugging/pneumonia. Pulmonary neoplasm or   endobronchial neoplasm are not excluded. Recommend further   evaluation with PET CT scan and pulmonary consultation for   bronchoscopic evaluation. 2. There is \"tree and bud\" airspace disease identified within the   superior segment of the left lower lobe. This is most commonly   seen with small airway disease such as a bronchiolitis          PATH:  7/13  FINAL DIAGNOSIS:     A. Lung, left upper lobe mass, wedge excision:       - Necrotizing granulomatous inflammation involving lung parenchyma         (see comment).       B. Lung, left upper lobe, lobectomy:       - Necrotizing granulomatous inflammation involving lung parenchyma         (see comment).      - Granulomatous inflammation involves bronchial mucosa at resection margin. COMMENT:    The granulomatous inflammation is extensive.  AFB stains were  performed on blocks A1 and B3. GMS stains were performed on blocks A1,  B1, and B3.  PAS stain was performed on block A1.  Microorganisms are not  seen except for one possible fungal structure in these specially stained  sections.  Though the stains appear generally negative, the findings are  highly suggestive of infection.      4/18 FINAL DIAGNOSIS:  Left lung mass biopsy:  - Non-caseating granuloma in benign lung tissue.  - Please refer to comment. COMMENT:  Sections show fragments of benign lung parenchyma containing a  few non-caseating granulomas associated with foci or chronic inflammation  and pneumocystic hyperplasia.  The granulomas are not caseating.  Special  stains for fungi and acid fast bacilli do not reveal any  microorganisms. IMPRESSION:    RONNY mass - s/p resection.  By BAL and needle biopsy, it is inflammatory with non-caseating granuloma.  Pt has hx Hodgkin Lymphoma and AML, past SCT (2001), on no immunosuppressants, no significant exposures (no outside much, no zoonotic exp - other than 2 cats).       7/14 RONNY resection - path + diffuse granulomatous inflammation, culture: + M.  Avium/intracellilare/scrofulaceum    Resistance testing - S clarithromycin, moxifoxacin, linezolid. Ethambutol and rifampin / rifabutin not report due to lack of correlation of in vitro sensitivity testing and clinical benefit.     Initiated treatment with regimen appropriate for MAC. Due to isolated pul d and resection of lesion (main burden of organism), pt is appropriate regimen for 3 times a week regimen. Plan treat for 6 months. Had ophtho exam (see [facility] report Media). On 8/17, pt had fever and HA. Rifabutin wound be most likely culprit. Stopped all 3 meds and on 8/24, restarted ethambutol and azithromycin    Has completed approximately 12 weeks therapy. Desire <6 mo.    RECOMMENDATIONS:      Continue Azithromycin 500 mg po tiw, Ethambutol 2,000 mg po tiw  Return to office in 3 months     - Spent over 40 minutes on visit (including history, physical exam, review of data, development and implementation of treatment plan and coordination of care.    - Over 50% of time spent in pt counseling and education - mycobacterial infection, meds, side effects    Vanna Cordoba MD

## 2017-12-26 ENCOUNTER — HOSPITAL ENCOUNTER (OUTPATIENT)
Dept: MAMMOGRAPHY | Age: 47
Discharge: HOME | End: 2017-12-26
Attending: INTERNAL MEDICINE | Admitting: INTERNAL MEDICINE

## 2017-12-26 DIAGNOSIS — Z12.31 VISIT FOR SCREENING MAMMOGRAM: ICD-10-CM

## 2018-02-02 ENCOUNTER — OFFICE VISIT (OUTPATIENT)
Dept: PULMONOLOGY | Age: 48
End: 2018-02-02

## 2018-02-02 VITALS
RESPIRATION RATE: 16 BRPM | OXYGEN SATURATION: 96 % | HEIGHT: 67 IN | DIASTOLIC BLOOD PRESSURE: 72 MMHG | SYSTOLIC BLOOD PRESSURE: 116 MMHG | HEART RATE: 82 BPM | WEIGHT: 233 LBS | BODY MASS INDEX: 36.57 KG/M2

## 2018-02-02 DIAGNOSIS — Z94.81 HISTORY OF BONE MARROW TRANSPLANT (HCC): ICD-10-CM

## 2018-02-02 DIAGNOSIS — A31.0 PULMONARY MYCOBACTERIAL INFECTION (HCC): Primary | ICD-10-CM

## 2018-02-02 DIAGNOSIS — C92.01 ACUTE MYELOID LEUKEMIA IN REMISSION (HCC): ICD-10-CM

## 2018-02-02 PROCEDURE — 99213 OFFICE O/P EST LOW 20 MIN: CPT | Performed by: INTERNAL MEDICINE

## 2018-02-02 RX ORDER — AZITHROMYCIN 500 MG/1
500 TABLET, FILM COATED ORAL DAILY
COMMUNITY
End: 2018-07-09 | Stop reason: ALTCHOICE

## 2018-02-02 RX ORDER — ETHAMBUTOL HYDROCHLORIDE 100 MG/1
400 TABLET, FILM COATED ORAL DAILY
COMMUNITY
End: 2018-07-09 | Stop reason: ALTCHOICE

## 2018-02-02 NOTE — PROGRESS NOTES
Subjective:      Patient ID: Kraig Arcos is a 56 y.o. female. HPI  Ms. Papo Hoyt returns for regular follow-up for mycobacterial pulmonary infection. She is now at 6 months out from initiation of treatment, post left upper lobectomy with resection of the infected lung. She has been maintained on azithromycin and ethambutol. Rifampin discontinued because of side effects. She has been feeling well, , has incorporated regular exercise at a gym as part of her weekly routine. She has not had any respiratory infections. She has observed some mild cough since exposure to cold air. She isn't really clearing any sputum. She denies any shortness of breath. The amount of cough is far less than what she had experienced last year when she presented with the mycobacterial infection. From that standpoint, she is not terribly concerned, but still does not want to let this go without any attention. It seems that her greatest worry was that it might be a signal that the mycobacterial infection was causing problems. Review of Systems    Objective:   Physical Exam   Constitutional: She is oriented to person, place, and time. She appears well-developed and well-nourished. HENT:   Head: Normocephalic and atraumatic. Mouth/Throat: Oropharynx is clear and moist. No oropharyngeal exudate. Eyes: Right eye exhibits no discharge. Left eye exhibits no discharge. No scleral icterus. Neck: No tracheal deviation present. No thyromegaly present. Cardiovascular: Normal rate, regular rhythm, normal heart sounds and intact distal pulses. No murmur heard. Pulmonary/Chest: Effort normal and breath sounds normal. No respiratory distress. She has no wheezes. She has no rales. Musculoskeletal: She exhibits no edema. Lymphadenopathy:     She has no cervical adenopathy. Neurological: She is alert and oriented to person, place, and time. Skin: Skin is warm and dry. Psychiatric: She has a normal mood and affect.  Her behavior is normal. Judgment and thought content normal.   Vitals reviewed. Assessment:      Mycobacterial infection, status post surgical resection of infected lobe. She has completed 6 months of therapy. This may be sufficient treatment. Mild cough may be related to environmental conditions. Plan:      Follow-up CT scan chest.  If this is clear of any apparent disease, it is reasonable to stop antibiotic therapy. I will discuss this with Dr. Heidi Kim, and she will follow up with him in 2 weeks. We will determine further follow-up after that.   RTC if cough does not continue to improve/resolve

## 2018-02-09 ENCOUNTER — HOSPITAL ENCOUNTER (OUTPATIENT)
Dept: CT IMAGING | Age: 48
Discharge: HOME | End: 2018-02-09
Attending: INTERNAL MEDICINE | Admitting: INTERNAL MEDICINE

## 2018-02-09 DIAGNOSIS — C92.01 ACUTE MYELOID LEUKEMIA IN REMISSION (HCC): ICD-10-CM

## 2018-02-09 DIAGNOSIS — A31.0 PULMONARY MYCOBACTERIAL INFECTION (HCC): ICD-10-CM

## 2018-02-09 DIAGNOSIS — Z94.81 HISTORY OF BONE MARROW TRANSPLANT (HCC): ICD-10-CM

## 2018-02-15 ENCOUNTER — OFFICE VISIT (OUTPATIENT)
Dept: INFECTIOUS DISEASES | Age: 48
End: 2018-02-15

## 2018-02-15 VITALS
DIASTOLIC BLOOD PRESSURE: 80 MMHG | WEIGHT: 233 LBS | HEIGHT: 67 IN | SYSTOLIC BLOOD PRESSURE: 126 MMHG | TEMPERATURE: 98.6 F | BODY MASS INDEX: 36.57 KG/M2

## 2018-02-15 DIAGNOSIS — A31.0 PULMONARY MYCOBACTERIAL INFECTION (HCC): Primary | ICD-10-CM

## 2018-02-15 PROCEDURE — 99214 OFFICE O/P EST MOD 30 MIN: CPT | Performed by: INTERNAL MEDICINE

## 2018-07-09 ENCOUNTER — OFFICE VISIT (OUTPATIENT)
Dept: ORTHOPEDIC SURGERY | Age: 48
End: 2018-07-09

## 2018-07-09 ENCOUNTER — TELEPHONE (OUTPATIENT)
Dept: ORTHOPEDIC SURGERY | Age: 48
End: 2018-07-09

## 2018-07-09 VITALS — WEIGHT: 235 LBS | HEIGHT: 67 IN | BODY MASS INDEX: 36.88 KG/M2

## 2018-07-09 DIAGNOSIS — S52.571A OTHER CLOSED INTRA-ARTICULAR FRACTURE OF DISTAL END OF RIGHT RADIUS, INITIAL ENCOUNTER: Primary | ICD-10-CM

## 2018-07-09 PROCEDURE — 99213 OFFICE O/P EST LOW 20 MIN: CPT | Performed by: ORTHOPAEDIC SURGERY

## 2018-07-09 NOTE — TELEPHONE ENCOUNTER
NPR per Lilly Emeryr at [facility] for outpatient surgery 07/10/18, CPT 22271

## 2018-07-09 NOTE — PROGRESS NOTES
Assessment: Severe right distal radius fracture which is really a volar Mosqueda's type fracture but there is also an intra-articular component with depression of the articular surface    Treatment Plan: She is on Coumadin on a chronic basis for hypercoagulability. 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 She has held her Coumadin since Friday as her PT was high on Friday but was normal therapeutic in the emergency room on Saturday and she is also held yesterday and today thinking that she might need surgery. I'm going to have her call her hematologist oncologist to verify whether we need to put her on Lovenox if we are doing her surgery tomorrow. She could go back on her Coumadin the following day    Return for post op. History of Present Illness  Zack Cortez is a 53 y.o. female. Patient's very nice lady who fell over the weekend and injured her right wrist.  She was seen at the emergency room at [facility] [place] and placed in a sugar tong splint. I had previously treated her several years ago with a ulnar nerve transposition on the right and carpal tunnel release on the right with an excellent result. Her past medical history is significant for having had a stem cell transplant for leukemia which is now completely in remission and probably cured. She also had a lobectomy performed for a previous infection in her long    Review of Systems  Pertinent items are noted in HPI  Complete Review of Systems reviewed from patient history form dated 7/9/18 and available in the patients chart under the media tab. Vital Signs  Vitals:    07/09/18 1519   Weight: 235 lb (106.6 kg)   Height: 5' 7\" (1.702 m)     Body mass index is 36.81 kg/m². Physical Exam  Constitutional:  Patient is well-nourished and demonstrates normal hygiene. Mental Status:  Patient is alert and oriented to person, place and time. Skin:  Intact, no rashes or lesions.     Hand

## 2018-07-10 ENCOUNTER — HOSPITAL ENCOUNTER (OUTPATIENT)
Dept: SURGERY | Age: 48
Discharge: HOME | End: 2018-07-10
Attending: ORTHOPAEDIC SURGERY | Admitting: ORTHOPAEDIC SURGERY

## 2018-07-10 VITALS
HEART RATE: 80 BPM | TEMPERATURE: 97.3 F | SYSTOLIC BLOOD PRESSURE: 110 MMHG | WEIGHT: 235 LBS | BODY MASS INDEX: 36.88 KG/M2 | DIASTOLIC BLOOD PRESSURE: 73 MMHG | HEIGHT: 67 IN | OXYGEN SATURATION: 95 % | RESPIRATION RATE: 16 BRPM

## 2018-07-10 DIAGNOSIS — S52.571D OTHER CLOSED INTRA-ARTICULAR FRACTURE OF DISTAL END OF RIGHT RADIUS WITH ROUTINE HEALING, SUBSEQUENT ENCOUNTER: Primary | ICD-10-CM

## 2018-07-10 PROBLEM — S52.501D CLOSED FRACTURE OF LOWER END OF RIGHT RADIUS WITH ROUTINE HEALING: Status: ACTIVE | Noted: 2018-07-10

## 2018-07-10 RX ORDER — SODIUM CHLORIDE, SODIUM LACTATE, POTASSIUM CHLORIDE, CALCIUM CHLORIDE 600; 310; 30; 20 MG/100ML; MG/100ML; MG/100ML; MG/100ML
INJECTION, SOLUTION INTRAVENOUS CONTINUOUS
Status: DISCONTINUED | OUTPATIENT
Start: 2018-07-10 | End: 2018-07-11 | Stop reason: HOSPADM

## 2018-07-10 RX ORDER — MEPERIDINE HYDROCHLORIDE 50 MG/ML
12.5 INJECTION INTRAMUSCULAR; INTRAVENOUS; SUBCUTANEOUS EVERY 5 MIN PRN
Status: DISCONTINUED | OUTPATIENT
Start: 2018-07-10 | End: 2018-07-11 | Stop reason: HOSPADM

## 2018-07-10 RX ORDER — OXYCODONE HYDROCHLORIDE AND ACETAMINOPHEN 5; 325 MG/1; MG/1
2 TABLET ORAL PRN
Status: COMPLETED | OUTPATIENT
Start: 2018-07-10 | End: 2018-07-10

## 2018-07-10 RX ORDER — ONDANSETRON 2 MG/ML
4 INJECTION INTRAMUSCULAR; INTRAVENOUS EVERY 30 MIN PRN
Status: DISCONTINUED | OUTPATIENT
Start: 2018-07-10 | End: 2018-07-11 | Stop reason: HOSPADM

## 2018-07-10 RX ORDER — OXYCODONE HYDROCHLORIDE AND ACETAMINOPHEN 5; 325 MG/1; MG/1
1 TABLET ORAL PRN
Status: COMPLETED | OUTPATIENT
Start: 2018-07-10 | End: 2018-07-10

## 2018-07-10 RX ORDER — LABETALOL HYDROCHLORIDE 5 MG/ML
5 INJECTION, SOLUTION INTRAVENOUS
Status: DISCONTINUED | OUTPATIENT
Start: 2018-07-10 | End: 2018-07-11 | Stop reason: HOSPADM

## 2018-07-10 RX ORDER — ONDANSETRON 4 MG/1
4 TABLET, FILM COATED ORAL
Qty: 20 TABLET | Refills: 0 | OUTPATIENT
Start: 2018-07-10 | End: 2018-11-19

## 2018-07-10 RX ORDER — DIPHENHYDRAMINE HYDROCHLORIDE 50 MG/ML
6.25 INJECTION INTRAMUSCULAR; INTRAVENOUS
Status: ACTIVE | OUTPATIENT
Start: 2018-07-10 | End: 2018-07-10

## 2018-07-10 RX ORDER — HYDRALAZINE HYDROCHLORIDE 20 MG/ML
5 INJECTION INTRAMUSCULAR; INTRAVENOUS EVERY 30 MIN PRN
Status: DISCONTINUED | OUTPATIENT
Start: 2018-07-10 | End: 2018-07-11 | Stop reason: HOSPADM

## 2018-07-10 RX ADMIN — Medication 0.5 MG: at 12:34

## 2018-07-10 RX ADMIN — ONDANSETRON 4 MG: 2 INJECTION INTRAMUSCULAR; INTRAVENOUS at 13:10

## 2018-07-10 RX ADMIN — Medication 0.5 MG: at 12:43

## 2018-07-10 RX ADMIN — SODIUM CHLORIDE, SODIUM LACTATE, POTASSIUM CHLORIDE, CALCIUM CHLORIDE: 600; 310; 30; 20 INJECTION, SOLUTION INTRAVENOUS at 10:03

## 2018-07-10 RX ADMIN — OXYCODONE HYDROCHLORIDE AND ACETAMINOPHEN 1 TABLET: 5; 325 TABLET ORAL at 12:55

## 2018-07-10 ASSESSMENT — PAIN SCALES - GENERAL
PAINLEVEL_OUTOF10: 10
PAINLEVEL_OUTOF10: 8
PAINLEVEL_OUTOF10: 10
PAINLEVEL_OUTOF10: 7

## 2018-07-10 ASSESSMENT — PAIN - FUNCTIONAL ASSESSMENT: PAIN_FUNCTIONAL_ASSESSMENT: 0-10

## 2018-07-10 NOTE — PROGRESS NOTES
AXILLARY BRACHIAL PLEXUS BLOCK NOTE      Regional Anesthetic Block requested by surgeon for post-op analgesia. Original plan for supraclavicular block, but pt did not tolerate  despite sedation, so converted to axillary approach which she tolerated well. Time performed:  1373-6143    Risks/benefits/options discussed & accepted by patient. Pt. Informed that block will attenuate but not eliminate post-operative pain.     Surgical procedure:  ORIF radius    Time-out done, site confirmed & marked by surgeon/designee:  Right    IV sedation:  Propofol 80mg in divided doses to pt. comfort    Procedure:  Oxygen @ 3LPM NC                       Pulse oximeter monitoring, Sp02>95% during block                       Alcohol prep, 1% lido 5cc local anesthesia                       Aseptic technique observed                       22g.X50mm Stimuplex needle placed w/o difficulty in                       axillary sheath using U/S guidance (image on chart)                       Median/Radial/Ulnar evoked motor response observed @ 2Hz                       Start mA:   1.05  End mA:  0.60                       Negative aspiration of blood/csf throughout procedure                       20cc 0.25% Bupivacaine injected in 5cc increments                       Pt. tolerated well, block onset confirmed                       No immediate complications      Ilya Peters M.D.

## 2018-07-10 NOTE — BRIEF OP NOTE
Brief Postoperative Note    Aleja Chi  YOB: 1970  [id number]    Pre-operative Diagnosis: intrarticular displaced Mosqueda's fracture right wrist    Post-operative Diagnosis: Same    Procedure: ORIF same  X ray    Anesthesia: General/ regional    Surgeons/Assistants: patt    Estimated Blood Loss: 50    Complications: None    Specimens: Was Not Obtained    Findings: as    Electronically signed by Cheryl Dillon MD on 7/10/2018 at 12:43 PM

## 2018-07-10 NOTE — ANESTHESIA PRE-OP
Department of Anesthesiology  Preprocedure Note       Name:  Abigail Enamorado   Age:  53 y.o.  :  1970                                          MRN:  [id number]         Date:  7/10/2018      Surgeon:    Procedure:    Medications prior to admission:   Prior to Admission medications    Medication Sig Start Date End Date Taking?  Authorizing Provider   sertraline (ZOLOFT) 50 MG tablet TAKE ONE TABLET BY MOUTH DAILY 17  Yes Nathanael Lorenzana MD   butalbital-acetaminophen-caffeine (FIORICET, ESGIC) -53 MG per tablet Take 1 tablet by mouth every 4 hours as needed for Headaches    Historical Provider, MD   warfarin (COUMADIN) 5 MG tablet Take 1 tablet by mouth daily Or as directed by MD 17   Nathanael Lorenzana MD       Current medications:    Current Outpatient Prescriptions   Medication Sig Dispense Refill    sertraline (ZOLOFT) 50 MG tablet TAKE ONE TABLET BY MOUTH DAILY 30 tablet 2    butalbital-acetaminophen-caffeine (FIORICET, ESGIC) -40 MG per tablet Take 1 tablet by mouth every 4 hours as needed for Headaches      warfarin (COUMADIN) 5 MG tablet Take 1 tablet by mouth daily Or as directed by MD 30 tablet 1     Current Facility-Administered Medications   Medication Dose Route Frequency Provider Last Rate Last Dose    ceFAZolin (ANCEF) 2 g in sterile water 20 mL IV syringe  2 g Intravenous Once Facundo Rinaldi MD        lactated ringers infusion   Intravenous Continuous Nisha Torres  mL/hr at 07/10/18 1003      HYDROmorphone (DILAUDID) injection 0.5 mg  0.5 mg Intravenous Q10 Min PRN Nisha Torres MD        HYDROmorphone (DILAUDID) injection 0.5 mg  0.5 mg Intravenous Q5 Min PRN Nisha Torres MD        oxyCODONE-acetaminophen (PERCOCET) 5-325 MG per tablet 1 tablet  1 tablet Oral PRN Nisha Torres MD        Or    oxyCODONE-acetaminophen (PERCOCET) 5-325 MG per tablet 2 tablet  2 tablet Oral PRN Nisha Torres MD        diphenhydrAMINE (BENADRYL) injection 6.25 mg  6.25 mg Intravenous Once PRN Ryne Schofield MD        [facility]dansetron [facility]) injection 4 mg  4 mg Intravenous Q30 Min PRN Ryne Schofield MD        labetalol (NORMODYNE;TRANDATE) injection 5 mg  5 mg Intravenous Q15 Min PRN Ryne Schofield MD        hydrALAZINE (APRESOLINE) injection 5 mg  5 mg Intravenous Q30 Min PRN Ryne Schofield MD        meperidine (DEMEROL) injection 12.5 mg  12.5 mg Intravenous Q5 Min PRN Ryne Schofield MD           Allergies:     Allergies   Allergen Reactions    Rifabutin Other (See Comments)     Causes High Fevers    Morphine Nausea And Vomiting       Problem List:    Patient Active Problem List   Diagnosis Code    Hodgkin's disease ([facility] C81.90    Acute myeloid leukemia in remission ([facility] C92.01    Long term current use of anticoagulant therapy Z79.01    History of bone marrow transplant ([facility] Z94.81    Deep venous thrombosis of right upper extremity ([facility] I82.621    History of chemotherapy Z92.21    Thickened endometrium R93.8    Endometrial hyperplasia N85.00    Post-menopausal bleeding N95.0    Vaginal atrophy N95.2    Climacteric N95.1    Pulmonary mycobacterial infection ([facility] A31.0    Cobalamin deficiency E53.8    Carpal tunnel syndrome G56.00    Cough R05    Hyperlipidemia E78.5    Lesion of ulnar nerve G56.20    Qbeuatuat-Quzwevm-Snmhta disease M22.40    Hypercoagulable state ([facility] D68.59    Vitamin D deficiency E55.9    History of shingles Z86.19    Obesity, Class II, BMI 35-39.9, with comorbidity E66.9       Past Medical History:        Diagnosis Date    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    AML (acute myeloblastic leukemia) ([facility] 2001    DVT (deep venous thrombosis) (HCC)     Factor VIII deficiency (HCC)     History of blood transfusion     possibly with stem cell and surgeries    History of shingles     Hodgkin's disease ([facility] 1999    Hx of blood clots     Obesity, Class II, BMI 35-39.9, with comorbidity     SVT (supraventricular tachycardia) ([facility]     1 occurance 2008       Past Surgical History:        Procedure Laterality Date    BONE MARROW TRANSPLANT  2001    CARPAL TUNNEL RELEASE Right 2013    DENTAL SURGERY      Back right molar removed    DILATION AND CURETTAGE OF UTERUS      x2   Sisi Jordan - pericardial window   Nancy Jordan    THORACOTOMY Left 07/13/2017    Dr. Cornelio Jordan - via VATS w/minithoracotomy for removal of RONNY, mobilization from L lung adhesions, biopsy    TUNNELED VENOUS CATHETER PLACEMENT Right 2001    since removed    TUNNELED VENOUS PORT PLACEMENT Left 1999    LUE, since removed    WISDOM TOOTH EXTRACTION         Social History:    Social History   Substance Use Topics    Smoking status: Never Smoker    Smokeless tobacco: Never Used    Alcohol use Yes      Comment: occasionally                                Counseling given: Not Answered      Vital Signs (Current):   Vitals:    07/10/18 0946   BP: 121/82   Pulse: 85   Resp: 20   Temp: 97.4 °F (36.3 °C)   TempSrc: Temporal   SpO2: 99%   Weight: 235 lb (106.6 kg)   Height: 5' 7\" (1.702 m)                                              BP Readings from Last 3 Encounters:   07/10/18 121/82   02/15/18 126/80   02/02/18 116/72       NPO Status: Time of last liquid consumption: 0500 (SIP OF FLUID WITH AM PILL)                        Time of last solid consumption: 2020                        Date of last liquid consumption: 07/10/18                        Date of last solid food consumption: 07/09/18    BMI:   Wt Readings from Last 3 Encounters:   07/10/18 235 lb (106.6 kg)   07/09/18 235 lb (106.6 kg)   07/09/18 235 lb (106.6 kg)     Body mass index is 36.81 kg/m².     Anesthesia Evaluation  Patient summary reviewed and Nursing notes reviewed no history of

## 2018-07-10 NOTE — PROGRESS NOTES
Pt states now nauseated. Nancy Chin spoke with pt, asuured her although painful will get better. Falling asleep on cart now. Has had pepsi and crackers.

## 2018-07-10 NOTE — PROGRESS NOTES
POCT      Blood Glucose:      (Normal Range 70-99)    PT:       16.8 (Normal Range 9.8 - 13)    INR:  1.4      (Normal Range 0.86-1.14)

## 2018-07-11 NOTE — OP NOTE
[place], [place] [address]                                 OPERATIVE REPORT    PATIENT NAME: Jesusita SONI                      :        1970  MED REC NO:   [id number]                          ROOM:  ACCOUNT NO:   [de-identified]                          ADMIT DATE: 07/10/2018  PROVIDER:     Eliana Medina MD    DATE OF PROCEDURE:  07/10/2018    PREOPERATIVE DIAGNOSIS:  Comminuted intra-articular displaced Mosqueda's  fracture of the right distal radius. POSTOPERATIVE DIAGNOSIS:  Comminuted intra-articular displaced Mosqueda's  fracture of the right distal radius. OPERATION PERFORMED:  1. Open reduction and internal fixation 3-part intra-articular volar  Mosqueda's fracture. 2.  Interpretation, intraoperative, PA, lateral, and oblique x-rays of the  wrist with fluoroscopy. SURGEON:  Eliana Medina MD.    ANESTHESIA:  General with regional nerve block. COMPLICATIONS:  None. INDICATIONS FOR PROCEDURE:  The patient presented to the office after a  fall with a displaced Mosqueda's fracture. She also had depressed central  articular fragment. DESCRIPTION of PROCEDURE:  The patient was placed on the operating table in  supine position and general anesthetic was placed. The arm was then  prepped with ChloraPrep and draped in sterile fashion. The arm was  exsanguinated using Ace bandage and a well-padded tourniquet inflated about  the upper arm to 250 mmHg. The longitudinal incision was made on the  radial aspect of the wrist and dissection was carried down retracting  radial artery to the radial side and then, the fascia overlying the distal  radius was opened. The pronator quadratus was then opened exposing the  volar Mosqueda's fracture.   With a combination of longitudinal traction and  using a freer elevator up through the fracture site, we were able to  elevate the central depressed articular fragment and then

## 2018-07-19 ENCOUNTER — HOSPITAL ENCOUNTER (OUTPATIENT)
Dept: OCCUPATIONAL THERAPY | Age: 48
Setting detail: THERAPIES SERIES
Discharge: HOME | End: 2018-07-19
Payer: COMMERCIAL

## 2018-07-19 PROCEDURE — G8984 CARRY CURRENT STATUS: HCPCS | Performed by: OCCUPATIONAL THERAPIST

## 2018-07-19 PROCEDURE — L3906 WHO W/O JOINTS CF: HCPCS | Performed by: OCCUPATIONAL THERAPIST

## 2018-07-19 PROCEDURE — 97110 THERAPEUTIC EXERCISES: CPT | Performed by: OCCUPATIONAL THERAPIST

## 2018-07-19 PROCEDURE — 97165 OT EVAL LOW COMPLEX 30 MIN: CPT | Performed by: OCCUPATIONAL THERAPIST

## 2018-07-19 PROCEDURE — G8985 CARRY GOAL STATUS: HCPCS | Performed by: OCCUPATIONAL THERAPIST

## 2018-07-19 NOTE — FLOWSHEET NOTE
[facility]  [address], [address]  Phone: [phone] Fax: [phone]      Hand Therapy Daily Treatment Note  Date:  2018    Patient: Kelly Walter   : 1970   MRN: [id number]  Referring Physician: Referring Practitioner: Renard Duenas MD       Medical Diagnosis Information:  Diagnosis: S52.571D (ICD-10-CM) - Other closed intra-articular fracture of distal end of right radius with routine healing    Treatment Diagnosis: R wrist stiffness - M25.531                                         Insurance information:    Date of Injury: 18  Date of Surgery: ORIF R distal radius - 7/10/18       Visit # Insurance Allowable   1      Date of Patient follow up with Physician:     G-Codes:  OT G-codes  Functional Assessment Tool Used: quick dash  Score: 66%  Functional Limitation: Carrying, moving and handling objects  Carrying, Moving and Handling Objects Current Status (): At least 60 percent but less than 80 percent impaired, limited or restricted  Carrying, Moving and Handling Objects Goal Status ():  At least 20 percent but less than 40 percent impaired, limited or restricted    Progress Note: []  Yes  [x]  No  Next due by: Visit #10      Latex Allergy:  [x]NO      []YES            Pacemaker:  [x] No       [] Yes      Preferred Language for Healthcare:   [x]English       []other:    Pain level:  3-4/10     SUBJECTIVE:  Background/Relevant Medical & Therapy History: tripped and fell while walking quickly on 18 at her mother's independent living facility; surgery 7/10/18    RESTRICTIONS/PRECAUTIONS: surgical protocol    OBJECTIVE:       Date:  2018       Objective Measures:         See eval                       Modalities:         -                       Therapeutic Exercise, Activities, NMR:        AROM 10x2 each full fist, flat fist, thumb ext/flex    10x2 wrist ext/flex, FA rotation   10x wrist will demonstrate increased strength to R /pinch >/= 50% of L for improved independence with opening jars. 5) Pt will have a decrease in pain to 2-3/10 to facilitate improvement in return to sewing/knitting activities. 6)    7)    Progression Towards Functional goals:  [] Patient is progressing as expected towards functional goals listed. [] Progression is slowed due to complexities listed. [] Progression has been slowed due to co-morbidities. [x] Plan just implemented, too soon to assess goals progression  [] All goals are met  [] Other:     ASSESSMENT:  See eval    Treatment/Activity Tolerance:  [] Patient tolerated treatment well [] Patient limited by fatique  [x] Patient limited by pain  [] Patient limited by other medical complications  [] Other:     Prognosis: [x] Good [] Fair  [] Poor    Patient Requires Follow-up: [x] Yes  [] No    PLAN: See eval  [] Continue per plan of care [] Alter current plan (see comments)  [x] Plan of care initiated [] Hold pending MD visit [] Discharge    Electronically signed by:  Mart Perez OTR/L, PT, MPT, [address] [place] [address], [phone], [id number]

## 2018-07-19 NOTE — PLAN OF CARE
[address] [facility], [place] [address], [address], [address]  Phone: [phone] Fax: [phone]      Marce Petty  Dear Referring Practitioner: Mike Zhao MD,     We had the pleasure of evaluating the following patient for occupational therapy services at [address] [place] [address]. A summary of our findings can be found in the initial assessment below. This includes our plan of care. If you have any questions or concerns regarding these findings, please do not hesitate to contact me at the office phone number checked above. Thank you for the referral.     Physician Signature:_______________________________Date:__________________  By signing above (or electronic signature), therapists plan is approved by physician      Patient: John Pritchett   : 1970   MRN: [id number]  Referring Physician: Referring Practitioner: Mike Zhao MD      Evaluation Date: 2018      Medical Diagnosis Information:  Diagnosis: S52.571D (ICD-10-CM) - Other closed intra-articular fracture of distal end of right radius with routine healing    Treatment Diagnosis: R wrist stiffness - M25.531                  Insurance information:    Date of Injury: 18  Date of Surgery: ORIF R distal radius - 7/10/18      Precautions/ Contra-indications: surgical protocol  Latex Allergy:  [x]No      []Yes  Pacemaker:  [x] No       [] Yes     Preferred Language for Healthcare:   [x]English       []other:      G-Codes:  OT G-codes  Functional Assessment Tool Used: quick dash  Score: 66%  Functional Limitation: Carrying, moving and handling objects  Carrying, Moving and Handling Objects Current Status (): At least 60 percent but less than 80 percent impaired, limited or restricted  Carrying, Moving and Handling Objects Goal Status ():  At least 20 percent but less than 40 percent impaired, limited or restricted    [x] Patient reported history, allergies, and medications reviewed - see intake form. SUBJECTIVE:  Background/Relevant Medical & Therapy History: tripped and fell while walking quickly on 7/7/18 at her mother's independent living facility; surgery 7/10/18      Pain Scale: 3-4/10   []Constant      [x]Intermittent    []other:  Pain Location:  R wrist  Easing factors: rest  Provocative factors: movement, use      Occupational Profile:  Home Enviroment: lives with  [] spouse,  [] family,  [x] alone,  [] significant other,   [] other:    Occupation/School: manager at [employer]     Recreational Activities/Meaningful Interests: golf, gardening, sewing/knitting    Prior Level of Function: [x] Independent with ADLs/IADLs     [] Assistance needed (describe):    Patient-Identified Primary Performance Deficits (to be addressed in POC):   [] bathing    [x] household tasks    [x] dressing    [] self feeding    [] grooming    [] work/education   [] functional mobility   [] sleeping/rest   [] toileting/hygiene   [x] recreational activities   [] driving    [] community/social participation   [] other:     Comorbidities Affecting Functional Performance:     [x]Anxiety (F41.9)/Depression (F32.9)   []Diabetes Type 1(E10.65) or 2 (E11.65)   []Rheumatoid Arthritis (M05.9)  []Fibromyalgia (M79.7)  []Neuropathy(G60.9)  [x]Osteoarthritis(M19.91)  []None   [x]Other: history of lymphoma, leukemia (~2001); previous R CTR, CubTunnel Release     Hand Dominance:   [x]  Right    [] Left      OBJECTIVE:    Involved    AROM: Right Left   Digits: tips to DPFC   3cm IF/LF  2.5cm RF  1.5cm SF   0cm IF-SF     Thumb tip to DPFC 4.5cm 0cm   Thumb RA               PA     Wrist Ext/Flex            RD/UD 30/25  15/17 75/70  25/40   Forearm sup/pron   25/90 80/85        Edema:      At wrist 18.5cm 17.2cm   LF P1   7.1cm 6.5cm   Strength: Contraindicated    Not Tested     Observations (including splints, bandages, incisions, scars):   Sutures intact along volar wrist/distal FA incision    Sensation: [] No reported deficits  [x] Intact to light touch    [] [place] Nancy test completed, findings as noted:  [] Other:    Palpation: tenderness reported about incision and over dorsal hand    Functional Mobility/Transfers/Gait: [x] Independent - no significant gait deviations  [] Assistance needed   [] Assistive device used: Falls Risk Assessment (30 days):   [x] Falls Risk assessed and no intervention required. [] Falls Risk assessed and Patient requires intervention due to being higher risk   TUG score (>12s at risk):     [] Falls education provided, including      Review Of Systems (ROS): [x]Performed Review of systems (Integumentary, CardioPulmonary, Neurological) by intake and observation. Intake form has been scanned into medical record. Patient has been instructed to contact their primary care physician regarding ROS issues if not already being addressed at this time. ASSESSMENT:   This patient presents with signs and symptoms consistent with the medical diagnosis provided by the referring physician. Impairments (physical, cognitive and/or psychosocial):  [x] Decreased mobility   [x] Weakness    [x] Hypersensitivity   [x] Pain/tenderness   [x] Edema/swelling   [x] Decreased coordination (fine/gross motor)   [] Impaired body mechanics  [] Sensory loss  [] Loss of balance   [] Other:      Performance Deficits (to be addressed in plan of care):   [] Bathing    [x] Household Tasks   [x] Dressing    [] Self Feeding   [] Grooming    [] Work/Education   [] Functional Mobility   [] Sleeping/Rest   [] Toileting/Hygiene   [x] Recreational Activities   [] Driving    [] Community/Social Participation   [] Other:     Rehab Potential:   [] Excellent [] Good [] Fair  [] Poor     Barriers affecting rehab potential:  []Age    []Lack of Motivation   []Co-Morbidities  []Cognitive Function  []Environmental/home/work barriers  []Other:     Tolerance of evaluation/treatment: functional    performance   [] an extensive additional review of review of medical and/or therapy records and physical, cognitive, or psychosocial history related to current    functional performance    [x] An assessment that identifies performance deficits (relating to physical, cognitive, or psychosocial skills) that result in activity limitations and/or participation restrictions:   [x] 1-3 performance deficits   [] 3-5 performance deficits   [] 5 or more performance deficits    [x] Clinical decision making of:   [x] low complexity, including analysis of occupational profile, data analysis from problem focused assessment, and consideration of a limited number of treatment options. No comorbidities affect occupational performance. No task modifications or assistance needed to complete evaluation. [] moderate complexity, including analysis of occupational profile, data analysis from detailed assessment and consideration of several treatment options. Comorbidities that affect occupational performance may be present. Minimal to moderate task modifications or assistance needed to complete assessment. [] high complexity, including analysis of occupational profile, analysis of data from comprehensive assessment and consideration of multiple treatment options. Multiple comorbidities present that affect occupational performance. Significant task modifications or assistance needed to complete assessment. Evaluation Code:  [x] Low Complexity EVAL 97679 (typically 30 minutes face to face)  [] Mod Complexity EVAL 18705 (typically 45 minutes face to face)  [] High Complexity EVAL 23346 (typically 60 minutes face to face)             Electronically signed by:   Mart Perez OTR/L, PT, MPT, [address] [place] [address], [phone], [phone]

## 2018-07-23 ENCOUNTER — HOSPITAL ENCOUNTER (OUTPATIENT)
Dept: OCCUPATIONAL THERAPY | Age: 48
Setting detail: THERAPIES SERIES
Discharge: HOME | End: 2018-07-23
Payer: COMMERCIAL

## 2018-07-23 ENCOUNTER — OFFICE VISIT (OUTPATIENT)
Dept: ORTHOPEDIC SURGERY | Age: 48
End: 2018-07-23

## 2018-07-23 DIAGNOSIS — S52.561D CLOSED BARTON'S FRACTURE OF RIGHT RADIUS WITH ROUTINE HEALING, SUBSEQUENT ENCOUNTER: Primary | ICD-10-CM

## 2018-07-23 PROCEDURE — 99024 POSTOP FOLLOW-UP VISIT: CPT | Performed by: ORTHOPAEDIC SURGERY

## 2018-07-23 PROCEDURE — 97530 THERAPEUTIC ACTIVITIES: CPT | Performed by: OCCUPATIONAL THERAPIST

## 2018-07-23 PROCEDURE — 97110 THERAPEUTIC EXERCISES: CPT | Performed by: OCCUPATIONAL THERAPIST

## 2018-07-23 NOTE — FLOWSHEET NOTE
carrying, lifting, house/yardwork, driving/computer work  [] (85880) Reviewed/Progressed HEP activities related to improving balance, coordination, kinesthetic sense, posture, motor skill, proprioception of scapular, scapulothoracic and UE control with self care, reaching, carrying, lifting, house/yardwork, driving/computer work    [x] Comments: see sheet    Manual Treatments:  PROM / STM / Oscillations-Mobs:  G-I, II, III, IV (PA's, Inf., Post.)  [x] (79597) Provided manual therapy to mobilize soft tissue/joints of cervical/CT, scapular GHJ and UE for the purpose of modulating pain, promoting relaxation,  increasing ROM, reducing/eliminating soft tissue swelling/inflammation/restriction, improving soft tissue extensibility and allowing for proper ROM for normal function with self care, reaching, carrying, lifting, house/yardwork, driving/computer work  [x] Comments: 2' gentle, retrograde massage/STM    ADL Training:  []  (59305) Provided self-care/home management training related to activities of daily living and compensatory training, and/or use of adaptive equipment   [] Comments:     Splinting:  [] Fabrication of:   [] ([phone] for orthotic/prosthetic use, established patient   [] (97862) Orthotic management and training (fitting and assessment)  [x] Comments: good splint fit - reapplied after tx, reviewed application    Charges:  Timed Code Treatment Minutes: 40   Total Treatment Minutes: 45     [] EVAL (LOW) 96584   [] OT Re-eval (26928)  [] EVAL (MOD) 53764   [] EVAL (HIGH) 07813       [x] Grace (85719) x  2   [] KJFDB(70394)  [] NMR (49598) x      [] Estim (attended) (54600)   [] Manual ([id number]) x       [] US (32728)  [x] TA (30063) x  1   [] Paraffin (61788)  [] ADL  (20 [phone] 60) x     [] Splint/L code:   [] Estim (unattended) (22 [id number])  [] Fluidotherapy (98918)  [] Other:    GOALS:  Short Term Goals: To be achieved in: 2 weeks  1.  Independent in HEP and progression per patient tolerance, in order to prevent

## 2018-07-23 NOTE — PROGRESS NOTES
Assessment: Excellent realignment of a very severe right distal radius fracture with a volar Mosqueda's component as well as an impacted central fragment    Treatment Plan: We removed her sutures today. She may go ahead and continue progressing along nicely in therapy she may have her brace off for sedentary activities but should wear it while sleeping and during any active movement    Return in about 1 month (around 8/23/2018) for X-ray next visit. History of Present Illness  Demetrio Figueredo is a 53 y.o. female. 1st postop visit    Review of Systems  Pertinent items are noted in HPI  Complete Review of Systems reviewed from patient history form dated 2018 and available in the patients chart under the media tab. Vital Signs  There were no vitals filed for this visit. There is no height or weight on file to calculate BMI. Physical Exam  Constitutional:  Patient is well-nourished and demonstrates normal hygiene. Mental Status:  Patient is alert and oriented to person, place and time. Skin:  Intact, no rashes or lesions.     Hand Examination: Wounds are healed nicely expected amount of swelling    Additional Comments:     Additional Examinations:  X-Ray Findings:  PA lateral and oblique x-rays of the right wrist show good position of the volar buttress plate with good realignment of the articular surface good restoration of radial length  Additional Diagnostic Test Findings:    Office Procedures:    Orders Placed This Encounter   Procedures    XR WRIST RIGHT (MIN 3 VIEWS)     Order Specific Question:   Reason for exam:     Answer:   wrist fracture

## 2018-07-26 ENCOUNTER — HOSPITAL ENCOUNTER (OUTPATIENT)
Dept: OCCUPATIONAL THERAPY | Age: 48
Setting detail: THERAPIES SERIES
Discharge: HOME | End: 2018-07-26
Payer: COMMERCIAL

## 2018-07-26 PROCEDURE — 97140 MANUAL THERAPY 1/> REGIONS: CPT | Performed by: OCCUPATIONAL THERAPIST

## 2018-07-26 PROCEDURE — 97530 THERAPEUTIC ACTIVITIES: CPT | Performed by: OCCUPATIONAL THERAPIST

## 2018-07-26 PROCEDURE — 97110 THERAPEUTIC EXERCISES: CPT | Performed by: OCCUPATIONAL THERAPIST

## 2018-07-26 PROCEDURE — 97022 WHIRLPOOL THERAPY: CPT | Performed by: OCCUPATIONAL THERAPIST

## 2018-07-26 NOTE — FLOWSHEET NOTE
IF  0.5cmLF-SF      Thumb tip to DPFC   4.5cm 1.75cm 1.25cm     Wrist ext/flex            rd/ud    FA sup/pron   30/25  15/17    25/90 45/30  17/25 55/90 51/44  17/25 73/90     Modalities:        Fluidotherapy - - 20'                     Therapeutic Exercise, Activities, NMR:        AROM 10x2 each full fist, flat fist, thumb ext/flex    10x2 wrist ext/flex, FA rotation   10x wrist ud/rd - see sheet 10x2 wrist ext/flex, ud/rd, full fist    10x each flat fist, FA rotation, thumb ext/flex    Wrist tenodesis x 10    Review and reinforcement of exercise technique needed. 10x2 FA rotation, wrist ext/flex        10x each full and flat fist, wrist rd/ud    Instructed pt on gentle AA/PROM wrist into ext/flex     Incision Care Cleaned with peroxide, dressed with gauze wrap, compression sleeve (D)  -     Activities  Thumb to base of SF to  foam blocks (med  X30, mini x30)    Wrist alphabet x 1 series Finger fitness spheres for digital ROM/FA sup x 20    Page turning x 15    *reinforcement needed to minimize substitutions, cocontracting                           Therapeutic Exercise and NMR:  [x] (81591) Provided verbal/tactile cueing for activities related to strengthening, flexibility, endurance, ROM  for improvements in scapular, scapulothoracic and UE control with self care, reaching, carrying, lifting, house/yardwork, driving/computer work. [x] (82422) Provided verbal/tactile cueing for activities related to improving balance, coordination, kinesthetic sense, posture, motor skill, proprioception  to assist with  scapular, scapulothoracic and UE control with self care, reaching, carrying, lifting, house/yardwork, driving/computer work.   [x] Comments: reinforcement needed to minimize substitutions, cocontracting    Therapeutic Activities:    [x] (71593 or 38426) Provided verbal/tactile cueing for activities related to improving balance, coordination, kinesthetic sense, posture, motor skill, proprioception and

## 2018-07-30 ENCOUNTER — APPOINTMENT (OUTPATIENT)
Dept: OCCUPATIONAL THERAPY | Age: 48
End: 2018-07-30
Payer: COMMERCIAL

## 2018-07-30 ENCOUNTER — OFFICE VISIT (OUTPATIENT)
Dept: ORTHOPEDIC SURGERY | Age: 48
End: 2018-07-30

## 2018-07-30 DIAGNOSIS — M25.531 RIGHT WRIST PAIN: Primary | ICD-10-CM

## 2018-07-30 DIAGNOSIS — S52.571D OTHER CLOSED INTRA-ARTICULAR FRACTURE OF DISTAL END OF RIGHT RADIUS WITH ROUTINE HEALING, SUBSEQUENT ENCOUNTER: ICD-10-CM

## 2018-07-30 DIAGNOSIS — M79.644 FINGER PAIN, RIGHT: ICD-10-CM

## 2018-07-30 DIAGNOSIS — G56.31 RADIAL NEURITIS, RIGHT: ICD-10-CM

## 2018-07-30 PROCEDURE — 73110 X-RAY EXAM OF WRIST: CPT | Performed by: ORTHOPAEDIC SURGERY

## 2018-07-30 PROCEDURE — 99024 POSTOP FOLLOW-UP VISIT: CPT | Performed by: ORTHOPAEDIC SURGERY

## 2018-07-30 NOTE — PROGRESS NOTES
Assessment: Tear is having a lot more thumb pain and probably some traumatic neuritis of her radial sensory nerve. Treatment Plan: I don't see any evidence of fracture instability of the thumb and I think this is a lot of pain from her tendon sliding over the fracture site on the radial aspect of her wrist as well as a traumatic neuritis of her radial sensory nerve. It's very important for her to continue with therapy    Return in about 3 weeks (around 8/20/2018) for X-ray next visit. History of Present Illness  John Pritchett is a 53 y.o. female. Patient about 3 weeks post-ORIF of her distal radius. When she started therapy she started to have a lot more burning pain around her thenar eminence and on the radial aspect of her wrist and so we brought her in urgently today to be sure there was no other injury or no displacement of her fracture fragments    Review of Systems  Pertinent items are noted in HPI  Complete Review of Systems reviewed from patient history form dated 2018 and available in the patients chart under the media tab. Vital Signs  There were no vitals filed for this visit. There is no height or weight on file to calculate BMI. Physical Exam  Constitutional:  Patient is well-nourished and demonstrates normal hygiene. Mental Status:  Patient is alert and oriented to person, place and time. Skin:  Intact, no rashes or lesions. Hand Examination: She has the expected amount of swelling in her range of motion is actually getting significantly better she does have hypersensitivity over her radial sensory nerve branches    Additional Comments:     Additional Examinations:  X-Ray Findings:  PA lateral and oblique x-rays of the right wrist which included her thumb show no evidence of concomitant thumb fracture.   There is good alignment of her severe distal radius fracture  Additional Diagnostic Test Findings:    Office Procedures:    Orders Placed This Encounter   Procedures    XR WRIST RIGHT (MIN 3 VIEWS)

## 2018-08-01 ENCOUNTER — HOSPITAL ENCOUNTER (OUTPATIENT)
Dept: OCCUPATIONAL THERAPY | Age: 48
Setting detail: THERAPIES SERIES
Discharge: HOME | End: 2018-08-01
Payer: COMMERCIAL

## 2018-08-01 PROCEDURE — 97140 MANUAL THERAPY 1/> REGIONS: CPT | Performed by: OCCUPATIONAL THERAPIST

## 2018-08-01 PROCEDURE — 97022 WHIRLPOOL THERAPY: CPT | Performed by: OCCUPATIONAL THERAPIST

## 2018-08-01 PROCEDURE — 97530 THERAPEUTIC ACTIVITIES: CPT | Performed by: OCCUPATIONAL THERAPIST

## 2018-08-01 PROCEDURE — 97110 THERAPEUTIC EXERCISES: CPT | Performed by: OCCUPATIONAL THERAPIST

## 2018-08-01 NOTE — FLOWSHEET NOTE
[facility] [place] [address], [address] [facility], [address] [place] [address]  Phone: [phone] Fax: [phone]      Hand Therapy Daily Treatment Note  Date:  2018    Patient: Polly Bonner   : 1970   MRN: [id number]  Referring Physician: Referring Practitioner: So Bar MD       Medical Diagnosis Information:  Diagnosis: S52.571D (ICD-10-CM) - Other closed intra-articular fracture of distal end of right radius with routine healing    Treatment Diagnosis: R wrist stiffness - M25.531                                         Insurance information:    Date of Injury: 18  Date of Surgery: ORIF R distal radius - 7/10/18       Visit # Insurance Allowable   4      Date of Patient follow up with Physician:     G-Codes:  OT G-codes  Functional Assessment Tool Used: quick dash  Score: 66%  Functional Limitation: Carrying, moving and handling objects  Carrying, Moving and Handling Objects Current Status (): At least 60 percent but less than 80 percent impaired, limited or restricted  Carrying, Moving and Handling Objects Goal Status ():  At least 20 percent but less than 40 percent impaired, limited or restricted    Progress Note: []  Yes  [x]  No  Next due by: Visit #10      Latex Allergy:  [x]NO      []YES            Pacemaker:  [x] No       [] Yes      Preferred Language for Healthcare:   [x]English       []other:    Pain level:  3-4/10     SUBJECTIVE:  Cancelled appt for therapy earlier this week secondary to seeing MD for persistent thumb pain; seen by MD on ; MD reporting mild nerve irritation over thumb/wrist and recommending massage/desensitization techniques    Background/Relevant Medical & Therapy History: tripped and fell while walking quickly on 18 at her mother's independent living facility; surgery 7/10/18    RESTRICTIONS/PRECAUTIONS: surgical protocol    OBJECTIVE:       Date:  2018    Objective Comments:     Splinting:  [] Fabrication of:   [] (50774) Checkout for orthotic/prosthetic use, established patient   [] (26506) Orthotic management and training (fitting and assessment)  [] Comments:     Charges:  Timed Code Treatment Minutes: 45   Total Treatment Minutes: 70     [] EVAL (LOW) 82866   [] OT Re-eval (11649)  [] EVAL (MOD) 72901   [] EVAL (HIGH) 64365       [x] Grace (95117) x  1   [] BILJF(75064)  [] NMR (23793) x      [] Estim (attended) (22643)   [x] Manual ([id number]) x  1    [] US (23800)  [x] TA (48447) x  1   [] Paraffin (56691)  [] ADL  (89761) x     [] Splint/L code:    [] Estim (unattended) 33 93 31)  [x] Fluidotherapy (52940)  [] Other:    GOALS:  Short Term Goals: To be achieved in: 2 weeks  1. Independent in HEP and progression per patient tolerance, in order to prevent re-injury. 2. Patient will have a decrease in pain to facilitate improvement in movement, function, and ADLs as indicated by Functional Deficits.     Long Term Goals to be achieved in 8-10  Weeks through 9/27/18, including patient directed goals to address identified performance deficits:  1) Pt to be independent in graded HEP progression with a good level of effort and compliance. 2) Pt to report a score of 30 % or less on the Quick DASH disability questionnaire for increased performance with carrying, moving, and handling objects. 3) Pt will demonstrate increased ROM to R wrist ext/flex >/= 50 deg, FA sup >/= 60 deg for improved independence with dressing tasks. 4) Pt will demonstrate increased strength to R /pinch >/= 50% of L for improved independence with opening jars. 5) Pt will have a decrease in pain to 2-3/10 to facilitate improvement in return to sewing/knitting activities. 6)    7)    Progression Towards Functional goals:  [x] Patient is progressing as expected towards functional goals listed. [] Progression is slowed due to complexities listed. [] Progression has been slowed due to co-morbidities.   [] Plan just implemented, too soon to assess goals progression  [] All goals are met  [] Other:     ASSESSMENT:    Treatment/Activity Tolerance:  [x] Patient tolerated treatment well [] Patient limited by fatique  [] Patient limited by pain  [] Patient limited by other medical complications  [x] Other: ROM continues to improve    Prognosis: [x] Good [] Fair  [] Poor    Patient Requires Follow-up: [x] Yes  [] No    PLAN: [x] Continue per plan of care [] Alter current plan (see comments)  [] Plan of care initiated [] Hold pending MD visit [] Discharge    Electronically signed by:  Mart Leonard St OTR/L, PT, MPT, [address] [place] [address], [phone], [phone]

## 2018-08-06 ENCOUNTER — HOSPITAL ENCOUNTER (OUTPATIENT)
Dept: OCCUPATIONAL THERAPY | Age: 48
Setting detail: THERAPIES SERIES
Discharge: HOME | End: 2018-08-06
Payer: COMMERCIAL

## 2018-08-06 PROCEDURE — 97022 WHIRLPOOL THERAPY: CPT | Performed by: OCCUPATIONAL THERAPIST

## 2018-08-06 PROCEDURE — 97140 MANUAL THERAPY 1/> REGIONS: CPT | Performed by: OCCUPATIONAL THERAPIST

## 2018-08-06 PROCEDURE — 97110 THERAPEUTIC EXERCISES: CPT | Performed by: OCCUPATIONAL THERAPIST

## 2018-08-06 PROCEDURE — 97530 THERAPEUTIC ACTIVITIES: CPT | Performed by: OCCUPATIONAL THERAPIST

## 2018-08-06 NOTE — FLOWSHEET NOTE
3 weeks, 1 day S/p ~4 weeks   Digits: tips to DPFC   3cm IF/LF  2.5cm RF  1.5cm SF 1cm IF  0.5cmLF-SF      Thumb tip to DPFC   4.5cm 1.75cm 1.25cm 1cm 0cm   Wrist ext/flex            rd/ud    FA sup/pron   30/25  15/17    25/90 45/30  17/25    55/90 51/44  17/25    73/90 60/48  17/25    76/90 60/50    II     R 17#   Modalities:        Fluidotherapy - - 20' 17' 18'                   Therapeutic Exercise, Activities, NMR:        AROM 10x2 each full fist, flat fist, thumb ext/flex    10x2 wrist ext/flex, FA rotation   10x wrist ud/rd - see sheet 10x2 wrist ext/flex, ud/rd, full fist    10x each flat fist, FA rotation, thumb ext/flex    Wrist tenodesis x 10    Review and reinforcement of exercise technique needed.  10x2 FA rotation, wrist ext/flex        10x each full and flat fist, wrist rd/ud    Instructed pt on gentle AA/PROM wrist into ext/flex 10x2 FA rotation, wrist ext/flex; 10x rd/ud    3x each full and flat fist    Gentle AA/PROM wrist into ext/flex x 10x2 10x wrist ext/flex; 10x rd/ud    10x full fist; reinforced technique for max IF MP flexion/decreased thumb adduction substitution - AA/PROM IF x 10    Gentle AA/PROM wrist, FA into ext/flex x 10x2    Reinforced over table edge self PROM wrist x 10   Incision Care Cleaned with peroxide, dressed with gauze wrap, compression sleeve (D)  -  -   Activities  Thumb to base of SF to  foam blocks (med  X30, mini x30)    Wrist alphabet x 1 series Finger fitness spheres for digital ROM/FA sup x 20    Page turning x 15    *reinforcement needed to minimize substitutions, cocontracting     Wrist weight well, no resistance - ~10x each wrist ext, flex    Placement of pegs (thumb/IF opp) x 25x2; thumb to base of SF to remove pegs x 25    Peg rolling at base of SF x 10    Wire wrist maze x 10 Yellow sponge - gripping with digits/rolling with thumb x 20; tip pinching x 20    B UE activities for wrist/hand mechanics - folding towel, tieing bows, thumb circles Therapeutic Exercise and NMR:  [x] (58978) Provided verbal/tactile cueing for activities related to strengthening, flexibility, endurance, ROM  for improvements in scapular, scapulothoracic and UE control with self care, reaching, carrying, lifting, house/yardwork, driving/computer work. [x] (21096) Provided verbal/tactile cueing for activities related to improving balance, coordination, kinesthetic sense, posture, motor skill, proprioception  to assist with  scapular, scapulothoracic and UE control with self care, reaching, carrying, lifting, house/yardwork, driving/computer work.   [x] Comments: reinforcement needed to minimize substitutions, cocontracting    Therapeutic Activities:    [x] (60104 or 88363) Provided verbal/tactile cueing for activities related to improving balance, coordination, kinesthetic sense, posture, motor skill, proprioception and motor activation to allow for proper function of scapular, scapulothoracic and UE control with self care, carrying, lifting, driving/computer work  [] Comments:    Home Exercise Program:    [x] (64097) Reviewed/Progressed HEP activities related to strengthening, flexibility, endurance, ROM of scapular, scapulothoracic and UE control with self care, reaching, carrying, lifting, house/yardwork, driving/computer work  [x] (30549) Reviewed/Progressed HEP activities related to improving balance, coordination, kinesthetic sense, posture, motor skill, proprioception of scapular, scapulothoracic and UE control with self care, reaching, carrying, lifting, house/yardwork, driving/computer work    [] Comments:     Manual Treatments:  PROM / STM / Oscillations-Mobs:  G-I, II, III, IV (PA's, Inf., Post.)  [x] (55395) Provided manual therapy to mobilize soft tissue/joints of cervical/CT, scapular GHJ and UE for the purpose of modulating pain, promoting relaxation,  increasing ROM, reducing/eliminating soft tissue swelling/inflammation/restriction, improving soft

## 2018-08-08 ENCOUNTER — HOSPITAL ENCOUNTER (OUTPATIENT)
Dept: OCCUPATIONAL THERAPY | Age: 48
Setting detail: THERAPIES SERIES
Discharge: HOME | End: 2018-08-08
Payer: COMMERCIAL

## 2018-08-08 PROCEDURE — 97022 WHIRLPOOL THERAPY: CPT | Performed by: OCCUPATIONAL THERAPIST

## 2018-08-08 PROCEDURE — 97110 THERAPEUTIC EXERCISES: CPT | Performed by: OCCUPATIONAL THERAPIST

## 2018-08-08 PROCEDURE — 97530 THERAPEUTIC ACTIVITIES: CPT | Performed by: OCCUPATIONAL THERAPIST

## 2018-08-08 PROCEDURE — 97140 MANUAL THERAPY 1/> REGIONS: CPT | Performed by: OCCUPATIONAL THERAPIST

## 2018-08-08 NOTE — FLOWSHEET NOTE
self-care/home management training related to activities of daily living and compensatory training, and/or use of adaptive equipment   [] Comments:     Splinting:  [] Fabrication of:   [] (82106) Checkout for orthotic/prosthetic use, established patient   [] (57266) Orthotic management and training (fitting and assessment)  [] Comments: splint reapplied after tx    Charges:  Timed Code Treatment Minutes: 40   Total Treatment Minutes: 65     [] EVAL (LOW) 10792   [] OT Re-eval (10464)  [] EVAL (MOD) 40309   [] EVAL (HIGH) 56732       [x] Grace (15562) x  1   [] JRJOD(40810)  [] NMR (66401) x      [] Estim (attended) (89698)   [x] Manual ([id number]) x  1    [] US (75988)  [x] TA (99570) x  1   [] Paraffin (36064)  [] ADL  (95588) x     [] Splint/L code:    [] Estim (unattended) (14658)  [x] Fluidotherapy (34493)  [] Other:    GOALS:  Short Term Goals: To be achieved in: 2 weeks  1. Independent in HEP and progression per patient tolerance, in order to prevent re-injury. 2. Patient will have a decrease in pain to facilitate improvement in movement, function, and ADLs as indicated by Functional Deficits.     Long Term Goals to be achieved in 8-10  Weeks through 9/27/18, including patient directed goals to address identified performance deficits:  1) Pt to be independent in graded HEP progression with a good level of effort and compliance. 2) Pt to report a score of 30 % or less on the Quick DASH disability questionnaire for increased performance with carrying, moving, and handling objects. 3) Pt will demonstrate increased ROM to R wrist ext/flex >/= 50 deg, FA sup >/= 60 deg for improved independence with dressing tasks. 4) Pt will demonstrate increased strength to R /pinch >/= 50% of L for improved independence with opening jars. Met 8/8/2018  5) Pt will have a decrease in pain to 2-3/10 to facilitate improvement in return to sewing/knitting activities.   6)    7)    Progression Towards Functional goals:  [x] Patient

## 2018-08-13 ENCOUNTER — HOSPITAL ENCOUNTER (OUTPATIENT)
Dept: OCCUPATIONAL THERAPY | Age: 48
Setting detail: THERAPIES SERIES
Discharge: HOME | End: 2018-08-13
Payer: COMMERCIAL

## 2018-08-13 PROCEDURE — 97110 THERAPEUTIC EXERCISES: CPT | Performed by: OCCUPATIONAL THERAPIST

## 2018-08-13 PROCEDURE — 97140 MANUAL THERAPY 1/> REGIONS: CPT | Performed by: OCCUPATIONAL THERAPIST

## 2018-08-13 PROCEDURE — 97022 WHIRLPOOL THERAPY: CPT | Performed by: OCCUPATIONAL THERAPIST

## 2018-08-13 NOTE — FLOWSHEET NOTE
[facility]  [address], [address]  Phone: [phone] Fax: [phone]      Hand Therapy Daily Treatment Note  Date:  2018    Patient: John Pritchett   : 1970   MRN: [id number]  Referring Physician: Referring Practitioner: Mike Zhao MD       Medical Diagnosis Information:  Diagnosis: S52.571D (ICD-10-CM) - Other closed intra-articular fracture of distal end of right radius with routine healing  Treatment Diagnosis: R wrist stiffness - M25.531                                         Insurance information:    Date of Injury: 18  Date of Surgery: ORIF R distal radius - 7/10/18       Visit # Insurance Allowable   7      Date of Patient follow up with Physician:     G-Codes:  OT G-codes  Functional Assessment Tool Used: quick dash  Score: 66%  Functional Limitation: Carrying, moving and handling objects  Carrying, Moving and Handling Objects Current Status (): At least 60 percent but less than 80 percent impaired, limited or restricted  Carrying, Moving and Handling Objects Goal Status ():  At least 20 percent but less than 40 percent impaired, limited or restricted    Progress Note: []  Yes  [x]  No  Next due by: Visit #10      Latex Allergy:  [x]NO      []YES            Pacemaker:  [x] No       [] Yes      Preferred Language for Healthcare:   [x]English       []other:    Pain level:  3-4/10     SUBJECTIVE:  Compliant with HEP, splint, compression sleeves    Background/Relevant Medical & Therapy History: tripped and fell while walking quickly on 18 at her mother's independent living facility; surgery 7/10/18    RESTRICTIONS/PRECAUTIONS: surgical protocol    OBJECTIVE:       Date: 18   Objective Measures: S/p 2 weeks, 2 days S/p 3 weeks, 1 day S/p ~4 weeks  S/p ~ 5 weeks   Digits: tips to DPFC          Thumb tip to DPFC   1.25cm 1cm 0cm     Wrist ext/flex rd/ud    FA sup/pron   51/44  17/25    73/90 60/48  17/25    76/90 60/50  63/53      84/90    II   R 17# R 34         L  56#  9.5         15  7.5         12    Modalities:        Fluidotherapy 20' 17' 18' 15' 15'                   Therapeutic Exercise, Activities, NMR:        AROM 10x2 FA rotation, wrist ext/flex        10x each full and flat fist, wrist rd/ud    Instructed pt on gentle AA/PROM wrist into ext/flex 10x2 FA rotation, wrist ext/flex; 10x rd/ud    3x each full and flat fist    Gentle AA/PROM wrist into ext/flex x 10x2 10x wrist ext/flex; 10x rd/ud    10x full fist; reinforced technique for max IF MP flexion/decreased thumb adduction substitution - AA/PROM IF x 10    Gentle AA/PROM wrist, FA into ext/flex x 10x2    Reinforced over table edge self PROM wrist x 10 10x2 wrist ext/flex    10x full fist    AA/PROM wrist x 10, digital flexion x 10, FA x 10    Power Web x 10 each wrist ext, flex stretching 10x2 wrist ext/flex      AA/PROM wrist x 10x2,  FA x 10x2    Power Web x 10 each wrist ext, flex stretching    Red Tband PROM stretching, 10x2 each wrist ext, flex   Activities Finger fitness spheres for digital ROM/FA sup x 20    Page turning x 15    *reinforcement needed to minimize substitutions, cocontracting     Wrist weight well, no resistance - ~10x each wrist ext, flex    Placement of pegs (thumb/IF opp) x 25x2; thumb to base of SF to remove pegs x 25    Peg rolling at base of SF x 10    Wire wrist maze x 10 Yellow sponge - gripping with digits/rolling with thumb x 20; tip pinching x 20    B UE activities for wrist/hand mechanics - folding towel, tieing bows, thumb circles      Tea strainer (light and med resistance) to /release foam blocks x 30x3    Finger fitness spheres x 30 circles, FA sustained in supination    Wrist weight well, no resistance - 10x2 each wrist ext and flex in FA pronation    Strengthening     1#, 10x2 wrist ext, flex    1#, FA rotation 10x2    FA bar, 8oz x 10 Therapeutic Exercise and NMR:  [x] (47007) Provided verbal/tactile cueing for activities related to strengthening, flexibility, endurance, ROM  for improvements in scapular, scapulothoracic and UE control with self care, reaching, carrying, lifting, house/yardwork, driving/computer work. [x] (28629) Provided verbal/tactile cueing for activities related to improving balance, coordination, kinesthetic sense, posture, motor skill, proprioception  to assist with  scapular, scapulothoracic and UE control with self care, reaching, carrying, lifting, house/yardwork, driving/computer work.   [x] Comments: reinforcement needed to minimize substitutions, cocontracting    Therapeutic Activities:    [x] (18782 or 48176) Provided verbal/tactile cueing for activities related to improving balance, coordination, kinesthetic sense, posture, motor skill, proprioception and motor activation to allow for proper function of scapular, scapulothoracic and UE control with self care, carrying, lifting, driving/computer work  [] Comments:    Home Exercise Program:    [x] (40185) Reviewed/Progressed HEP activities related to strengthening, flexibility, endurance, ROM of scapular, scapulothoracic and UE control with self care, reaching, carrying, lifting, house/yardwork, driving/computer work  [x] (73893) Reviewed/Progressed HEP activities related to improving balance, coordination, kinesthetic sense, posture, motor skill, proprioception of scapular, scapulothoracic and UE control with self care, reaching, carrying, lifting, house/yardwork, driving/computer work    [] Comments:     Manual Treatments:  PROM / STM / Oscillations-Mobs:  G-I, II, III, IV (PA's, Inf., Post.)  [x] (75085) Provided manual therapy to mobilize soft tissue/joints of cervical/CT, scapular GHJ and UE for the purpose of modulating pain, promoting relaxation,  increasing ROM, reducing/eliminating soft tissue swelling/inflammation/restriction, improving soft tissue extensibility and allowing for proper ROM for normal function with self care, reaching, carrying, lifting, house/yardwork, driving/computer work  [x] Comments: 8' gentle, desense massage retrograde massage/STM, AA/PROM    ADL Training:  []  (86383) Provided self-care/home management training related to activities of daily living and compensatory training, and/or use of adaptive equipment   [] Comments:     Splinting:  [] Fabrication of:   [] (58934) Checkout for orthotic/prosthetic use, established patient   [] (62319) Orthotic management and training (fitting and assessment)  [] Comments: splint reapplied after tx    Charges:  Timed Code Treatment Minutes: 40   Total Treatment Minutes: 60     [] EVAL (LOW) 03321   [] OT Re-eval (40141)  [] EVAL (MOD) 27721   [] EVAL (HIGH) 23576       [x] Grace (50915) x  2   [] KBGOJ(93854)  [] NMR (22572) x      [] Estim (attended) (81976)   [x] Manual ([id number]) x  1    [] US (01580)  [] TA (00367) x      [] Paraffin (32453)  [] ADL  (79078) x     [] Splint/L code:    [] Estim (unattended) (19810)  [x] Fluidotherapy (72519)  [] Other:    GOALS:  Short Term Goals: To be achieved in: 2 weeks  1. Independent in HEP and progression per patient tolerance, in order to prevent re-injury. 2. Patient will have a decrease in pain to facilitate improvement in movement, function, and ADLs as indicated by Functional Deficits.     Long Term Goals to be achieved in 8-10  Weeks through 9/27/18, including patient directed goals to address identified performance deficits:  1) Pt to be independent in graded HEP progression with a good level of effort and compliance. 2) Pt to report a score of 30 % or less on the Quick DASH disability questionnaire for increased performance with carrying, moving, and handling objects. 3) Pt will demonstrate increased ROM to R wrist ext/flex >/= 50 deg, FA sup >/= 60 deg for improved independence with dressing tasks. Met 8/13/2018  4) Pt will demonstrate increased

## 2018-08-15 ENCOUNTER — HOSPITAL ENCOUNTER (OUTPATIENT)
Dept: OCCUPATIONAL THERAPY | Age: 48
Setting detail: THERAPIES SERIES
Discharge: HOME | End: 2018-08-15
Payer: COMMERCIAL

## 2018-08-15 PROCEDURE — 97110 THERAPEUTIC EXERCISES: CPT | Performed by: OCCUPATIONAL THERAPIST

## 2018-08-15 PROCEDURE — 97112 NEUROMUSCULAR REEDUCATION: CPT | Performed by: OCCUPATIONAL THERAPIST

## 2018-08-15 PROCEDURE — 97140 MANUAL THERAPY 1/> REGIONS: CPT | Performed by: OCCUPATIONAL THERAPIST

## 2018-08-15 NOTE — FLOWSHEET NOTE
[facility]  [address],  [address], [address] [facility]  Phone: [phone] Fax: [phone]      Hand Therapy Daily Treatment Note  Date:  8/15/2018    Patient: Zack Cortez   : 1970   MRN: [id number]  Referring Physician: Referring Practitioner: Cristobal Burnette MD       Medical Diagnosis Information:  Diagnosis: S52.571D (ICD-10-CM) - Other closed intra-articular fracture of distal end of right radius with routine healing  Treatment Diagnosis: R wrist stiffness - M25.531                                         Insurance information:    Date of Injury: 18  Date of Surgery: ORIF R distal radius - 7/10/18       Visit # Insurance Allowable   8      Date of Patient follow up with Physician:     G-Codes:  OT G-codes  Functional Assessment Tool Used: quick dash  Score: 66%  Functional Limitation: Carrying, moving and handling objects  Carrying, Moving and Handling Objects Current Status (): At least 60 percent but less than 80 percent impaired, limited or restricted  Carrying, Moving and Handling Objects Goal Status ():  At least 20 percent but less than 40 percent impaired, limited or restricted    Progress Note: []  Yes  [x]  No  Next due by: Visit #10      Latex Allergy:  [x]NO      []YES            Pacemaker:  [x] No       [] Yes      Preferred Language for Healthcare:   [x]English       []other:    Pain level:  3-4/10     SUBJECTIVE:  Compliant with HEP, splint; persistent pain and irritation along radial thumb/FA, notably with stretching    Background/Relevant Medical & Therapy History: tripped and fell while walking quickly on 18 at her mother's independent living facility; surgery 7/10/18    RESTRICTIONS/PRECAUTIONS: surgical protocol    OBJECTIVE:       Date: 8/1/18 8/6/18 8/8/18 8/13/18 8/15/18   Objective Measures: S/p 3 weeks, 1 day S/p ~4 weeks  S/p ~ 5 weeks    Thumb tip to DPFC   1cm 0cm      Wrist ext/flex flex    1#, FA rotation 10x2    FA bar, 8oz x 10  1# 10x wrist ext,   1# 5x wrist flex, (5x wrist flex, no resistance due to thumb pain)             Therapeutic Exercise and NMR:  [x] (48840) Provided verbal/tactile cueing for activities related to strengthening, flexibility, endurance, ROM  for improvements in scapular, scapulothoracic and UE control with self care, reaching, carrying, lifting, house/yardwork, driving/computer work. [x] (73181) Provided verbal/tactile cueing for activities related to improving balance, coordination, kinesthetic sense, posture, motor skill, proprioception  to assist with  scapular, scapulothoracic and UE control with self care, reaching, carrying, lifting, house/yardwork, driving/computer work.   [x] Comments: reinforcement needed to minimize substitutions, cocontracting    Therapeutic Activities:    [] (10839 or 62960) Provided verbal/tactile cueing for activities related to improving balance, coordination, kinesthetic sense, posture, motor skill, proprioception and motor activation to allow for proper function of scapular, scapulothoracic and UE control with self care, carrying, lifting, driving/computer work  [] Comments:    Home Exercise Program:    [x] (12996) Reviewed/Progressed HEP activities related to strengthening, flexibility, endurance, ROM of scapular, scapulothoracic and UE control with self care, reaching, carrying, lifting, house/yardwork, driving/computer work  [x] (48006) Reviewed/Progressed HEP activities related to improving balance, coordination, kinesthetic sense, posture, motor skill, proprioception of scapular, scapulothoracic and UE control with self care, reaching, carrying, lifting, house/yardwork, driving/computer work    [] Comments:     Manual Treatments:  PROM / STM / Oscillations-Mobs:  G-I, II, III, IV (PA's, Inf., Post.)  [x] (28824) Provided manual therapy to mobilize soft tissue/joints of cervical/CT, scapular GHJ and UE for the purpose of objects. 3) Pt will demonstrate increased ROM to R wrist ext/flex >/= 50 deg, FA sup >/= 60 deg for improved independence with dressing tasks. Met 8/13/2018  4) Pt will demonstrate increased strength to R /pinch >/= 50% of L for improved independence with opening jars. Met 8/8/2018  5) Pt will have a decrease in pain to 2-3/10 to facilitate improvement in return to sewing/knitting activities. 6)    7)    Progression Towards Functional goals:  [x] Patient is progressing as expected towards functional goals listed. [] Progression is slowed due to complexities listed. [] Progression has been slowed due to co-morbidities. [] Plan just implemented, too soon to assess goals progression  [] All goals are met  [] Other:     ASSESSMENT:    Treatment/Activity Tolerance:  [x] Patient tolerated treatment well [] Patient limited by fatique  [] Patient limited by pain  [] Patient limited by other medical complications  [x] Other: strength increasing; persistent sensitivity along radial FA, scar; continue to reinforce gentle stretching, nerve glides, scar massage, desensitization    Prognosis: [x] Good [] Fair  [] Poor    Patient Requires Follow-up: [x] Yes  [] No    PLAN: [x] Continue per plan of care, progress note to MD at next visit [] Alter current plan (see comments)  [] Plan of care initiated [] Hold pending MD visit [] Discharge    Electronically signed by:  Mart Perez OTR/L, PT, MPT, [address] [place] [address], [id number], [id number]

## 2018-08-20 ENCOUNTER — OFFICE VISIT (OUTPATIENT)
Dept: ORTHOPEDIC SURGERY | Age: 48
End: 2018-08-20

## 2018-08-20 ENCOUNTER — HOSPITAL ENCOUNTER (OUTPATIENT)
Dept: OCCUPATIONAL THERAPY | Age: 48
Setting detail: THERAPIES SERIES
Discharge: HOME | End: 2018-08-20
Payer: COMMERCIAL

## 2018-08-20 DIAGNOSIS — S52.571D OTHER CLOSED INTRA-ARTICULAR FRACTURE OF DISTAL END OF RIGHT RADIUS WITH ROUTINE HEALING, SUBSEQUENT ENCOUNTER: ICD-10-CM

## 2018-08-20 DIAGNOSIS — M25.531 WRIST PAIN, RIGHT: Primary | ICD-10-CM

## 2018-08-20 PROCEDURE — 97140 MANUAL THERAPY 1/> REGIONS: CPT | Performed by: OCCUPATIONAL THERAPIST

## 2018-08-20 PROCEDURE — 99024 POSTOP FOLLOW-UP VISIT: CPT | Performed by: ORTHOPAEDIC SURGERY

## 2018-08-20 PROCEDURE — 97018 PARAFFIN BATH THERAPY: CPT | Performed by: OCCUPATIONAL THERAPIST

## 2018-08-20 PROCEDURE — 97110 THERAPEUTIC EXERCISES: CPT | Performed by: OCCUPATIONAL THERAPIST

## 2018-08-20 NOTE — FLOWSHEET NOTE
3 weeks, 1 day S/p ~4 weeks  S/p ~ 5 weeks     Thumb tip to DPFC   1cm 0cm       Wrist ext/flex            rd/ud    FA sup/pron   60/48  17/25    76/90 60/50  63/53      84/90 63/53      85/90 R: 60/57 (67/60)(passive)  L: 75/75    II  R 17# R 34         L  56#  9.5         15  7.5         12  40# 45#   Modalities:         Fluidotherapy 16' 18' 15' 15' Deferred, unit not preheated paraffin         R wrist: 17.8  L wrist: 16.8            Therapeutic Exercise, Activities, NMR:         AROM 10x2 FA rotation, wrist ext/flex; 10x rd/ud    3x each full and flat fist    Gentle AA/PROM wrist into ext/flex x 10x2 10x wrist ext/flex; 10x rd/ud    10x full fist; reinforced technique for max IF MP flexion/decreased thumb adduction substitution - AA/PROM IF x 10    Gentle AA/PROM wrist, FA into ext/flex x 10x2    Reinforced over table edge self PROM wrist x 10 10x2 wrist ext/flex    10x full fist    AA/PROM wrist x 10, digital flexion x 10, FA x 10    Power Web x 10 each wrist ext, flex stretching 10x2 wrist ext/flex      AA/PROM wrist x 10x2,  FA x 10x2    Power Web x 10 each wrist ext, flex stretching    Red Tband PROM stretching, 10x2 each wrist ext, flex 10x2 wrist ext/flex    AA/PROM wrist x 10x2,  FA x 10x2    Power Web x 10 each wrist ext, flex stretching    Red Tband PROM stretching, 10x each wrist ext, flex    Reinforced AA/PROM techniques    Nerve Glides - BP (shoulder/elbow/ wrist/finger ext x 3), SRN (elbow ext, wrist UD, thumb flex x 3) Prom wrist, standing wrist ext stretch    Power web stretch, push pull,   gripping paraffin   Activities Wrist weight well, no resistance - ~10x each wrist ext, flex    Placement of pegs (thumb/IF opp) x 25x2; thumb to base of SF to remove pegs x 25    Peg rolling at base of SF x 10    Wire wrist maze x 10 Yellow sponge - gripping with digits/rolling with thumb x 20; tip pinching x 20    B UE activities for wrist/hand mechanics - folding towel, tieing bows, thumb circles      Tea house/yardwork, driving/computer work    [x] Comments:  standing wrist extension with gradual progression to wt bearing 8/20/18     Manual Treatments:  PROM / STM / Oscillations-Mobs:  G-I, II, III, IV (PA's, Inf., Post.)  [x] (56069) Provided manual therapy to mobilize soft tissue/joints of cervical/CT, scapular GHJ and UE for the purpose of modulating pain, promoting relaxation,  increasing ROM, reducing/eliminating soft tissue swelling/inflammation/restriction, improving soft tissue extensibility and allowing for proper ROM for normal function with self care, reaching, carrying, lifting, house/yardwork, driving/computer work  [x] Comments: 12' gentle, desense massage retrograde massage/STM, AA/PROM, DTM, vibration, gentle joint mobilizations    ADL Training:  []  (94544) Provided self-care/home management training related to activities of daily living and compensatory training, and/or use of adaptive equipment   [] Comments:     Splinting:  [] Fabrication of:   [] (80583) Checkout for orthotic/prosthetic use, established patient   [] (78898) Orthotic management and training (fitting and assessment)  [] Comments: splint reapplied after tx    Charges:  Timed Code Treatment Minutes: 45   Total Treatment Minutes: 60     [] EVAL (LOW) 34301   [] OT Re-eval (95607)  [] EVAL (MOD) 27218   [] EVAL (HIGH) 71707       [x] Grace (71581) x  2   [] HVDPN(64375)  [] NMR (12108) x      [] Estim (attended) (75307)   [x] Manual ([id number]) x  1    [] US (48945)  [] TA (41847) x      [x] Paraffin (50534)   [] ADL  (14468) x     [] Splint/L code:    [] Estim (unattended) (08763)  [] Fluidotherapy (65910)  [] Other:    GOALS:  Short Term Goals: To be achieved in: 2 weeks  1. Independent in HEP and progression per patient tolerance, in order to prevent re-injury.    2. Patient will have a decrease in pain to facilitate improvement in movement, function, and ADLs as indicated by Functional Deficits.     Long Term Goals to be achieved in 8-10

## 2018-08-23 ENCOUNTER — HOSPITAL ENCOUNTER (OUTPATIENT)
Dept: OCCUPATIONAL THERAPY | Age: 48
Setting detail: THERAPIES SERIES
Discharge: HOME | End: 2018-08-23
Payer: COMMERCIAL

## 2018-08-23 PROCEDURE — 97530 THERAPEUTIC ACTIVITIES: CPT | Performed by: OCCUPATIONAL THERAPIST

## 2018-08-23 PROCEDURE — 97110 THERAPEUTIC EXERCISES: CPT | Performed by: OCCUPATIONAL THERAPIST

## 2018-08-23 PROCEDURE — 97022 WHIRLPOOL THERAPY: CPT | Performed by: OCCUPATIONAL THERAPIST

## 2018-08-23 PROCEDURE — 97140 MANUAL THERAPY 1/> REGIONS: CPT | Performed by: OCCUPATIONAL THERAPIST

## 2018-08-23 NOTE — PLAN OF CARE
[address] [facility], [place]   [address], [address], [address]  Phone: [phone] Fax: [phone]                                                            Hand Therapy Re-Certification Plan of Care    Dear Referring Practitioner: Renard Duenas MD,    We had the pleasure of treating the following patient for occupational therapy services at [address] [place] [address]. A summary of our findings can be found in the updated assessment below. This includes our plan of care. If you have any questions or concerns regarding these findings, please do not hesitate to contact me at the office phone number checked above. Thank you for the referral.     Physician Signature:________________________________Date:__________________  By signing above, therapists plan is approved by physician      Patient: Kelly Walter   : 1970   MRN: [id number]  Referring Physician:Referring Practitioner: Renard Duenas MD  Evaluation Date: 2018      Medical Diagnosis Information:  Diagnosis: S52.571D (ICD-10-CM) - Other closed intra-articular fracture of distal end of right radius with routine healing    Treatment Diagnosis: R wrist stiffness - M25.531    Insurance information:    Date of Injury: 18  Date of Surgery: 7/10/18      Date Range: 18-18  Total visits:  10      G-Codes:computer systems down on this date for pt's visit, new Quick DASH not completed/entered  Previous information from evaluation:  Functional Assessment Tool Used: quick dash  Score: 66%  Functional Limitation: Carrying, moving and handling objects  Carrying, Moving and Handling Objects Current Status (): At least 60 percent but less than 80 percent impaired, limited or restricted  Carrying, Moving and Handling Objects Goal Status ():  At least 20 percent but less than 40 percent impaired, limited or restricted       SUBJECTIVE: doing better overall,

## 2018-08-27 ENCOUNTER — HOSPITAL ENCOUNTER (OUTPATIENT)
Dept: OCCUPATIONAL THERAPY | Age: 48
Setting detail: THERAPIES SERIES
Discharge: HOME | End: 2018-08-27
Payer: COMMERCIAL

## 2018-08-27 PROCEDURE — 97110 THERAPEUTIC EXERCISES: CPT | Performed by: OCCUPATIONAL THERAPIST

## 2018-08-27 PROCEDURE — G8984 CARRY CURRENT STATUS: HCPCS | Performed by: OCCUPATIONAL THERAPIST

## 2018-08-27 PROCEDURE — G8985 CARRY GOAL STATUS: HCPCS | Performed by: OCCUPATIONAL THERAPIST

## 2018-08-27 PROCEDURE — 97140 MANUAL THERAPY 1/> REGIONS: CPT | Performed by: OCCUPATIONAL THERAPIST

## 2018-08-27 PROCEDURE — 97022 WHIRLPOOL THERAPY: CPT | Performed by: OCCUPATIONAL THERAPIST

## 2018-08-27 NOTE — FLOWSHEET NOTE
[facility] [address] [address] [address],  [address], [address] [place] [address]  Phone: [phone] Fax: [phone]      Hand Therapy Daily Treatment Note  Date:  2018    Patient: Morenita Garcia   : 1970   MRN: [id number]  Referring Physician: Referring Practitioner: Bekah Earl MD       Medical Diagnosis Information:  Diagnosis: S52.571D (ICD-10-CM) - Other closed intra-articular fracture of distal end of right radius with routine healing  Treatment Diagnosis: R wrist stiffness - M25.531                                         Insurance information:    Date of Injury: 18  Date of Surgery: ORIF R distal radius - 7/10/18       Visit # Insurance Allowable   11 40     Date of Patient follow up with Physician:     G-Codes:  OT G-codes  Functional Assessment Tool Used: Quick DASH  Score: 14%  Functional Limitation: Carrying, moving and handling objects  Carrying, Moving and Handling Objects Current Status (): At least 1 percent but less than 20 percent impaired, limited or restricted  Carrying, Moving and Handling Objects Goal Status ():  At least 20 percent but less than 40 percent impaired, limited or restricted      Progress Note: [x]  Yes  []  No  Next due by: Visit #10      Latex Allergy:  [x]NO      []YES            Pacemaker:  [x] No       [] Yes      Preferred Language for Healthcare:   [x]English       []other:    Pain level:  3-4/10     SUBJECTIVE:  Reports difficulty touching R shoulder with R hand due to stretch/pain felt along radial wrist/thumb; frustrated by slow progress of rom; reports difficulty with R wrist ROM prior to surgery/injury due to a previous stem cell surgery several years ago; reports able to do some light crocheting over the weekend    Background/Relevant Medical & Therapy History: tripped and fell while walking quickly on 18 at her mother's independent living facility; surgery 7/10/18    RESTRICTIONS/PRECAUTIONS: surgical protocol    OBJECTIVE:       Date: 8/15/18 8/20/18   8/23/18 8/27/18   Objective Measures:   S/p 6.5 weeks    Thumb tip to [facility]         Wrist ext/flex            rd/ud    FA sup/pron   63/53      85/90 R: 60/57 (67/60)(passive)  L: 75/75 61/57 68/60  (passive ext 70/  flex 64)    II 40# 45# 47# (vs L 75#) 47#   Modalities:       Fluidotherapy Deferred, unit not preheated paraffin 15' 18'     R wrist: 17.8  L wrist: 16.8            Therapeutic Exercise, Activities, NMR:       AROM 10x2 wrist ext/flex    AA/PROM wrist x 10x2,  FA x 10x2    Power Web x 10 each wrist ext, flex stretching    Red Tband PROM stretching, 10x each wrist ext, flex    Reinforced AA/PROM techniques    Nerve Glides - BP (shoulder/elbow/ wrist/finger ext x 3), SRN (elbow ext, wrist UD, thumb flex x 3) Prom wrist, standing wrist ext stretch    Power web stretch, push pull,   gripping paraffin 10x2 wrist ext/flex    AA/PROM wrist x 10x2,  FA x 10x2    Power Web x 10 each wrist ext, flex stretching    Reinforced AA/PROM techniques 10x3 wrist ext/flex    AA/PROM wrist x 10x3,  FA x 10x2    Power Web x 10 each wrist ext, flex stretching    Reinforced AA/PROM techniques    Functional passive stretching thumb flex/wrist flex x 10, additional of FA sup and elbow flex x 10    Towel stretch for functional reaching of R hand to R shoulder   Activities   Tea strainer (mod+ resistance) for pickup/release of foam blocks x 30x2    Red/blue hand gripper with foam blocks x 30x2    Separation of soft putty (using B hands) into 10 parts      Strengthening 1# 10x wrist ext,   1# 5x wrist flex, (5x wrist flex, no resistance due to thumb pain)  1# wrist ext, flex (reinforcement for technique) 10x2 each    1#, FA rotation 10x2     2#, 10x each wrist ext, flex; P&H x 3 each with 2# weight    2#, FA rotation x 10    B hand grippers (red R, red/blue L) 10x3            Therapeutic Exercise and NMR:  [x] (92753) Provided extensibility and allowing for proper ROM for normal function with self care, reaching, carrying, lifting, house/yardwork, driving/computer work  [x] Comments: 12' gentle, desense massage retrograde massage/STM, AA/PROM, DTM, vibration, gentle joint mobilizations    ADL Training:  []  (26278) Provided self-care/home management training related to activities of daily living and compensatory training, and/or use of adaptive equipment   [] Comments:     Splinting:  [] Fabrication of:   [] [phone] for orthotic/prosthetic use, established patient   [] (36350) Orthotic management and training (fitting and assessment)  [] Comments:     Charges:  Timed Code Treatment Minutes: 57   Total Treatment Minutes: 75     [] EVAL (LOW) 53792   [] OT Re-eval (45807)  [] EVAL (MOD) 52077   [] EVAL (HIGH) 94928       [x] Grace (87523) x  3   [] HZZFV(67130)  [] NMR (92369) x      [] Estim (attended) (33592)   [x] Manual ([id number]) x  1    [] US (79662)  [] TA (50014) x      [] Paraffin (32068)   [] ADL  (88 [phone] 60) x     [] Splint/L code:    [] Estim (unattended) 33 93 31)  [x] Fluidotherapy (00451)  [] Other:    GOALS:  Short Term Goals: To be achieved in: 2 weeks  1. Independent in HEP and progression per patient tolerance, in order to prevent re-injury. 2. Patient will have a decrease in pain to facilitate improvement in movement, function, and ADLs as indicated by Functional Deficits.     Long Term Goals to be achieved in 8-10  Weeks through 9/27/18, including patient directed goals to address identified performance deficits:  1) Pt to be independent in graded HEP progression with a good level of effort and compliance. 2) Pt to report a score of 30 % or less on the Quick DASH disability questionnaire for increased performance with carrying, moving, and handling objects. Met 8/27/2018  3) Pt will demonstrate increased ROM to R wrist ext/flex >/= 50 deg, FA sup >/= 60 deg for improved independence with dressing tasks. Met

## 2018-08-29 ENCOUNTER — HOSPITAL ENCOUNTER (OUTPATIENT)
Dept: OCCUPATIONAL THERAPY | Age: 48
Setting detail: THERAPIES SERIES
Discharge: HOME | End: 2018-08-29
Payer: COMMERCIAL

## 2018-08-29 PROCEDURE — 97140 MANUAL THERAPY 1/> REGIONS: CPT | Performed by: OCCUPATIONAL THERAPIST

## 2018-08-29 PROCEDURE — 97022 WHIRLPOOL THERAPY: CPT | Performed by: OCCUPATIONAL THERAPIST

## 2018-08-29 PROCEDURE — 97110 THERAPEUTIC EXERCISES: CPT | Performed by: OCCUPATIONAL THERAPIST

## 2018-08-29 NOTE — FLOWSHEET NOTE
[facility] the [facility] [facility], [facility]  [address],  [address], [address]  Phone: [phone] Fax: [phone]      Hand Therapy Daily Treatment Note  Date:  2018    Patient: Judson Hanley   : 1970   MRN: [id number]  Referring Physician: Referring Practitioner: Carlene Palacio MD       Medical Diagnosis Information:  Diagnosis: S52.571D (ICD-10-CM) - Other closed intra-articular fracture of distal end of right radius with routine healing  Treatment Diagnosis: R wrist stiffness - M25.531                                         Insurance information:    Date of Injury: 18  Date of Surgery: ORIF R distal radius - 7/10/18       Visit # Insurance Allowable   12 40     Date of Patient follow up with Physician:     G-Codes:  OT G-codes  Functional Assessment Tool Used: Quick DASH  Score: 14%  Functional Limitation: Carrying, moving and handling objects  Carrying, Moving and Handling Objects Current Status (): At least 1 percent but less than 20 percent impaired, limited or restricted  Carrying, Moving and Handling Objects Goal Status ():  At least 20 percent but less than 40 percent impaired, limited or restricted      Progress Note: [x]  Yes  []  No  Next due by: Visit #10      Latex Allergy:  [x]NO      []YES            Pacemaker:  [x] No       [] Yes      Preferred Language for Healthcare:   [x]English       []other:    Pain level:  3-4/10     SUBJECTIVE:  Compliant with HEP; able to touch shoulder with R hand this morning in the shower    Background/Relevant Medical & Therapy History: tripped and fell while walking quickly on 18 at her mother's independent living facility; surgery 7/10/18    RESTRICTIONS/PRECAUTIONS: surgical protocol    OBJECTIVE:       Date: 18   Objective Measures:  S/p 6.5 weeks  S/p 7 weeks, 1 day   Thumb tip to [facility]         Wrist ext/flex            rd/ud    FA sup/pron   R: 60/57

## 2018-09-05 ENCOUNTER — HOSPITAL ENCOUNTER (OUTPATIENT)
Dept: OCCUPATIONAL THERAPY | Age: 48
Setting detail: THERAPIES SERIES
Discharge: HOME | End: 2018-09-05
Payer: COMMERCIAL

## 2018-09-05 PROCEDURE — 97110 THERAPEUTIC EXERCISES: CPT | Performed by: OCCUPATIONAL THERAPIST

## 2018-09-05 PROCEDURE — 97022 WHIRLPOOL THERAPY: CPT | Performed by: OCCUPATIONAL THERAPIST

## 2018-09-05 PROCEDURE — 97140 MANUAL THERAPY 1/> REGIONS: CPT | Performed by: OCCUPATIONAL THERAPIST

## 2018-09-05 NOTE — FLOWSHEET NOTE
8/27/2018  3) Pt will demonstrate increased ROM to R wrist ext/flex >/= 50 deg, FA sup >/= 60 deg for improved independence with dressing tasks. Met 8/13/2018  4) Pt will demonstrate increased strength to R /pinch >/= 50% of L for improved independence with opening jars. Met 8/8/2018  5) Pt will have a decrease in pain to 2-3/10 to facilitate improvement in return to sewing/knitting activities. Progression Towards Functional goals:  [x] Patient is progressing as expected towards functional goals listed. [] Progression is slowed due to complexities listed. [] Progression has been slowed due to co-morbidities. [] Plan just implemented, too soon to assess goals progression  [] All goals are met  [] Other:    ASSESSMENT:    Treatment/Activity Tolerance:  [x] Patient tolerated treatment well [] Patient limited by fatique  [] Patient limited by pain  [] Patient limited by other medical complications  [x] Other: ROM and strength continue to improve - reinforced HEP frequency    Prognosis: [x] Good [] Fair  [] Poor    Patient Requires Follow-up: [x] Yes  [] No    PLAN: [x] Continue per plan of care, decrease frequency to 1x/week [] Alter current plan (see comments)  [] Plan of care initiated [] Hold pending MD visit [] Discharge    Electronically signed by:  Mart Perez OTR/L, PT, [facility], [place], [id number], [id number]

## 2018-09-10 ENCOUNTER — APPOINTMENT (OUTPATIENT)
Dept: OCCUPATIONAL THERAPY | Age: 48
End: 2018-09-10
Payer: COMMERCIAL

## 2018-09-12 ENCOUNTER — HOSPITAL ENCOUNTER (OUTPATIENT)
Dept: OCCUPATIONAL THERAPY | Age: 48
Setting detail: THERAPIES SERIES
Discharge: HOME | End: 2018-09-12
Payer: COMMERCIAL

## 2018-09-12 PROCEDURE — 97110 THERAPEUTIC EXERCISES: CPT | Performed by: OCCUPATIONAL THERAPIST

## 2018-09-12 PROCEDURE — 97022 WHIRLPOOL THERAPY: CPT | Performed by: OCCUPATIONAL THERAPIST

## 2018-09-12 PROCEDURE — 97140 MANUAL THERAPY 1/> REGIONS: CPT | Performed by: OCCUPATIONAL THERAPIST

## 2018-09-12 NOTE — FLOWSHEET NOTE
house/yardwork, driving/computer work. [x] (25555) Provided verbal/tactile cueing for activities related to improving balance, coordination, kinesthetic sense, posture, motor skill, proprioception  to assist with  scapular, scapulothoracic and UE control with self care, reaching, carrying, lifting, house/yardwork, driving/computer work.   [x] Comments: reinforcement needed to minimize substitutions, cocontracting    Therapeutic Activities:    [] (24152 or 84124) Provided verbal/tactile cueing for activities related to improving balance, coordination, kinesthetic sense, posture, motor skill, proprioception and motor activation to allow for proper function of scapular, scapulothoracic and UE control with self care, carrying, lifting, driving/computer work  [] Comments:    Home Exercise Program:    [x] (51799) Reviewed/Progressed HEP activities related to strengthening, flexibility, endurance, ROM of scapular, scapulothoracic and UE control with self care, reaching, carrying, lifting, house/yardwork, driving/computer work  [x] (80872) Reviewed/Progressed HEP activities related to improving balance, coordination, kinesthetic sense, posture, motor skill, proprioception of scapular, scapulothoracic and UE control with self care, reaching, carrying, lifting, house/yardwork, driving/computer work    [] Comments:     Manual Treatments:  PROM / STM / Oscillations-Mobs:  G-I, II, III, IV (PA's, Inf., Post.)  [x] (34858) Provided manual therapy to mobilize soft tissue/joints of cervical/CT, scapular GHJ and UE for the purpose of modulating pain, promoting relaxation,  increasing ROM, reducing/eliminating soft tissue swelling/inflammation/restriction, improving soft tissue extensibility and allowing for proper ROM for normal function with self care, reaching, carrying, lifting, house/yardwork, driving/computer work  [x] Comments: 8' gentle, desense massage retrograde massage/STM, AA/PROM, DTM, gentle joint mobilizations    ADL Training:  []  (70060) Provided self-care/home management training related to activities of daily living and compensatory training, and/or use of adaptive equipment   [] Comments:     Splinting:  [] Fabrication of:   [] (63199) Checkout for orthotic/prosthetic use, established patient   [] (25964) Orthotic management and training (fitting and assessment)  [] Comments:     Charges:  Timed Code Treatment Minutes: 38   Total Treatment Minutes: 60     [] EVAL (LOW) 66239   [] OT Re-eval (90425)  [] EVAL (MOD) 49837   [] EVAL (HIGH) 66131       [x] Grace (88005) x  2   [] TMSPZ(39969)  [] NMR (12801) x      [] Estim (attended) (89146)   [x] Manual ([id number]) x  1    [] US (01057)  [] TA (86684) x      [] Paraffin (52497)   [] ADL  (88 649 24 60) x     [] Splint/L code:    [] Estim (unattended) 33 93 31)  [x] Fluidotherapy (84345)  [] Other:    GOALS:  Short Term Goals: To be achieved in: 2 weeks  1. Independent in HEP and progression per patient tolerance, in order to prevent re-injury. 2. Patient will have a decrease in pain to facilitate improvement in movement, function, and ADLs as indicated by Functional Deficits.     Long Term Goals to be achieved in 8-10  Weeks through 9/27/18, including patient directed goals to address identified performance deficits:  1) Pt to be independent in graded HEP progression with a good level of effort and compliance. 2) Pt to report a score of 30 % or less on the Quick DASH disability questionnaire for increased performance with carrying, moving, and handling objects. Met 8/27/2018  3) Pt will demonstrate increased ROM to R wrist ext/flex >/= 50 deg, FA sup >/= 60 deg for improved independence with dressing tasks. Met 8/13/2018  4) Pt will demonstrate increased strength to R /pinch >/= 50% of L for improved independence with opening jars. Met 8/8/2018  5) Pt will have a decrease in pain to 2-3/10 to facilitate improvement in return to sewing/knitting activities.       Progression Towards

## 2018-09-19 ENCOUNTER — HOSPITAL ENCOUNTER (OUTPATIENT)
Dept: OCCUPATIONAL THERAPY | Age: 48
Setting detail: THERAPIES SERIES
Discharge: HOME | End: 2018-09-19
Payer: COMMERCIAL

## 2018-09-19 PROCEDURE — 97110 THERAPEUTIC EXERCISES: CPT | Performed by: OCCUPATIONAL THERAPIST

## 2018-09-19 PROCEDURE — 97022 WHIRLPOOL THERAPY: CPT | Performed by: OCCUPATIONAL THERAPIST

## 2018-09-19 PROCEDURE — 97140 MANUAL THERAPY 1/> REGIONS: CPT | Performed by: OCCUPATIONAL THERAPIST

## 2018-09-19 NOTE — FLOWSHEET NOTE
kinesthetic sense, posture, motor skill, proprioception  to assist with  scapular, scapulothoracic and UE control with self care, reaching, carrying, lifting, house/yardwork, driving/computer work.   [] Comments:     Therapeutic Activities:    [] (07611 or 23736) Provided verbal/tactile cueing for activities related to improving balance, coordination, kinesthetic sense, posture, motor skill, proprioception and motor activation to allow for proper function of scapular, scapulothoracic and UE control with self care, carrying, lifting, driving/computer work  [] Comments:    Home Exercise Program:    [x] (20238) Reviewed/Progressed HEP activities related to strengthening, flexibility, endurance, ROM of scapular, scapulothoracic and UE control with self care, reaching, carrying, lifting, house/yardwork, driving/computer work  [x] (57467) Reviewed/Progressed HEP activities related to improving balance, coordination, kinesthetic sense, posture, motor skill, proprioception of scapular, scapulothoracic and UE control with self care, reaching, carrying, lifting, house/yardwork, driving/computer work    [] Comments:     Manual Treatments:  PROM / STM / Oscillations-Mobs:  G-I, II, III, IV (PA's, Inf., Post.)  [x] (71011) Provided manual therapy to mobilize soft tissue/joints of cervical/CT, scapular GHJ and UE for the purpose of modulating pain, promoting relaxation,  increasing ROM, reducing/eliminating soft tissue swelling/inflammation/restriction, improving soft tissue extensibility and allowing for proper ROM for normal function with self care, reaching, carrying, lifting, house/yardwork, driving/computer work  [x] Comments: 8' gentle, desense massage retrograde massage/STM, AA/PROM, DTM, gentle joint mobilizations    ADL Training:  []  (19818) Provided self-care/home management training related to activities of daily living and compensatory training, and/or use of adaptive equipment   [] Comments:     Splinting:  [] co-morbidities. [] Plan just implemented, too soon to assess goals progression  [] All goals are met  [] Other:    ASSESSMENT:    Treatment/Activity Tolerance:  [x] Patient tolerated treatment well [] Patient limited by fatique  [] Patient limited by pain  [] Patient limited by other medical complications  [x] Other: decreasing pain, increasing rom/strength    Prognosis: [x] Good [] Fair  [] Poor    Patient Requires Follow-up: [x] Yes  [] No    PLAN: [x] Continue per plan of care, 1x/week [] Alter current plan (see comments)  [] Plan of care initiated [] Hold pending MD visit [] Discharge    Electronically signed by:  Mart Perez OTR/L, PT, MPT, [address] [place] [address], [id number], [id number]

## 2018-09-26 ENCOUNTER — HOSPITAL ENCOUNTER (OUTPATIENT)
Dept: OCCUPATIONAL THERAPY | Age: 48
Setting detail: THERAPIES SERIES
Discharge: HOME | End: 2018-09-26
Payer: COMMERCIAL

## 2018-09-26 PROBLEM — R05.9 COUGH: Status: RESOLVED | Noted: 2017-03-22 | Resolved: 2018-09-26

## 2018-09-26 PROCEDURE — 97110 THERAPEUTIC EXERCISES: CPT | Performed by: OCCUPATIONAL THERAPIST

## 2018-09-26 PROCEDURE — 97140 MANUAL THERAPY 1/> REGIONS: CPT | Performed by: OCCUPATIONAL THERAPIST

## 2018-09-26 NOTE — FLOWSHEET NOTE
weeks   Thumb tip to [facility]        Wrist ext/flex            rd/ud    FA sup/pron   68/65 68/68      90/90 69/67  25/35 90/90    II 57# 60#    Modalities:      Fluidotherapy 25' after treatment 20' after tx -               Therapeutic Exercise, Activities, NMR:      AROM 10x2 each A/PROM wrist (with and without composite stretching)    10x FA AROM    Power Web x 10 each composite stretch/wrist ext/flex with elbow ext 10x2 A/PROM    10x FA AROM    PW x 10 wrist ext, flex stretching    Prayer stretch x 10 10x2 A/PROM    10x FA AROM    PW x 10 wrist ext, flex stretching   Strengthening Wrist exerciser (mod resistance) x 25    FA Bar, 1#, 15x2    Therabar, red & green - B sup/pron x 10 each    Red/blue hand gripper to  foam blocks x 30x2 Therabar, green, 10x B sup/pron    FA bar, 1.5#, x 15    4#, 10x each wrist ext, flex (cueing for relaxed , tenodesis), 2#, x 10 each    Red/blue hand gripper x 10x3 Therabar, green, 10x B sup/pron, R sup/pron    FA bar, 1.5#, x 15    4#, 10x each wrist ext, flex (cueing for relaxed , tenodesis), 2#, x 10 each    Red/blue hand gripper x 10x3    Thumb Exerciser x 15    Thumb Oppociser x 15   Kinesiotaping   Fascial correction technique over area of scar thickness radial aspect of wrist incision - compression sleeve applied for increased security     Therapeutic Exercise and NMR:  [x] (74817) Provided verbal/tactile cueing for activities related to strengthening, flexibility, endurance, ROM  for improvements in scapular, scapulothoracic and UE control with self care, reaching, carrying, lifting, house/yardwork, driving/computer work. [x] (86502) Provided verbal/tactile cueing for activities related to improving balance, coordination, kinesthetic sense, posture, motor skill, proprioception  to assist with  scapular, scapulothoracic and UE control with self care, reaching, carrying, lifting, house/yardwork, driving/computer work.   [] Comments:     Therapeutic

## 2018-10-04 ENCOUNTER — HOSPITAL ENCOUNTER (OUTPATIENT)
Dept: OCCUPATIONAL THERAPY | Age: 48
Setting detail: THERAPIES SERIES
Discharge: HOME | End: 2018-10-04
Payer: COMMERCIAL

## 2018-10-04 ENCOUNTER — OFFICE VISIT (OUTPATIENT)
Dept: ORTHOPEDIC SURGERY | Age: 48
End: 2018-10-04

## 2018-10-04 VITALS — HEIGHT: 67 IN | WEIGHT: 235 LBS | BODY MASS INDEX: 36.88 KG/M2

## 2018-10-04 DIAGNOSIS — S52.571D OTHER CLOSED INTRA-ARTICULAR FRACTURE OF DISTAL END OF RIGHT RADIUS WITH ROUTINE HEALING, SUBSEQUENT ENCOUNTER: Primary | ICD-10-CM

## 2018-10-04 PROCEDURE — 97035 APP MDLTY 1+ULTRASOUND EA 15: CPT | Performed by: OCCUPATIONAL THERAPIST

## 2018-10-04 PROCEDURE — G8985 CARRY GOAL STATUS: HCPCS | Performed by: OCCUPATIONAL THERAPIST

## 2018-10-04 PROCEDURE — 97140 MANUAL THERAPY 1/> REGIONS: CPT | Performed by: OCCUPATIONAL THERAPIST

## 2018-10-04 PROCEDURE — G8984 CARRY CURRENT STATUS: HCPCS | Performed by: OCCUPATIONAL THERAPIST

## 2018-10-04 PROCEDURE — 97110 THERAPEUTIC EXERCISES: CPT | Performed by: OCCUPATIONAL THERAPIST

## 2018-10-04 PROCEDURE — 99024 POSTOP FOLLOW-UP VISIT: CPT | Performed by: ORTHOPAEDIC SURGERY

## 2018-10-04 NOTE — FLOWSHEET NOTE
Objective Measures: S/p 9 weeks, 1 day  S/p 11 weeks S/p 12 weeks, 2 days   Thumb tip to [facility]      0cm   Wrist ext/flex            rd/ud    FA sup/pron   68/65 68/68 90/90 69/67 25/35 90/90 69/67 28/30 90/90    II 57# 60#  60#   Modalities:       Fluidotherapy 25' after treatment 20' after tx - -   US, cont, 3MHz, 1.2wcm2, 8'    8' over volar/radial R wrist          Therapeutic Exercise, Activities, NMR:       AROM 10x2 each A/PROM wrist (with and without composite stretching)    10x FA AROM    Power Web x 10 each composite stretch/wrist ext/flex with elbow ext 10x2 A/PROM    10x FA AROM    PW x 10 wrist ext, flex stretching    Prayer stretch x 10 10x2 A/PROM    10x FA AROM    PW x 10 wrist ext, flex stretching 10x2 each A/PROM    10x FA AROM    PW x 10 wrist ext, flex stretching   Strengthening Wrist exerciser (mod resistance) x 25    FA Bar, 1#, 15x2    Therabar, red & green - B sup/pron x 10 each    Red/blue hand gripper to  foam blocks x 30x2 Therabar, green, 10x B sup/pron    FA bar, 1.5#, x 15    4#, 10x each wrist ext, flex (cueing for relaxed , tenodesis), 2#, x 10 each    Red/blue hand gripper x 10x3 Therabar, green, 10x B sup/pron, R sup/pron    FA bar, 1.5#, x 15    4#, 10x each wrist ext, flex (cueing for relaxed , tenodesis), 2#, x 10 each    Red/blue hand gripper x 10x3    Thumb Exerciser x 15    Thumb Oppociser x 15    Kinesiotaping   Fascial correction technique over area of scar thickness radial aspect of wrist incision - compression sleeve applied for increased security      Therapeutic Exercise and NMR:  [x] (95673) Provided verbal/tactile cueing for activities related to strengthening, flexibility, endurance, ROM  for improvements in scapular, scapulothoracic and UE control with self care, reaching, carrying, lifting, house/yardwork, driving/computer work.    [x] (88319) Provided verbal/tactile cueing for activities related to improving balance, coordination, kinesthetic sense, posture, motor skill, proprioception  to assist with  scapular, scapulothoracic and UE control with self care, reaching, carrying, lifting, house/yardwork, driving/computer work.   [] Comments:     Therapeutic Activities:    [] (12880 or 62387) Provided verbal/tactile cueing for activities related to improving balance, coordination, kinesthetic sense, posture, motor skill, proprioception and motor activation to allow for proper function of scapular, scapulothoracic and UE control with self care, carrying, lifting, driving/computer work  [] Comments:    Home Exercise Program:    [x] (49186) Reviewed/Progressed HEP activities related to strengthening, flexibility, endurance, ROM of scapular, scapulothoracic and UE control with self care, reaching, carrying, lifting, house/yardwork, driving/computer work  [x] (27698) Reviewed/Progressed HEP activities related to improving balance, coordination, kinesthetic sense, posture, motor skill, proprioception of scapular, scapulothoracic and UE control with self care, reaching, carrying, lifting, house/yardwork, driving/computer work    [] Comments:     Manual Treatments:  PROM / STM / Oscillations-Mobs:  G-I, II, III, IV (PA's, Inf., Post.)  [x] (19902) Provided manual therapy to mobilize soft tissue/joints of cervical/CT, scapular GHJ and UE for the purpose of modulating pain, promoting relaxation,  increasing ROM, reducing/eliminating soft tissue swelling/inflammation/restriction, improving soft tissue extensibility and allowing for proper ROM for normal function with self care, reaching, carrying, lifting, house/yardwork, driving/computer work  [x] Comments: 10' gentle, desense massage retrograde massage/STM, AA/PROM, DTM, gentle joint mobilizations    ADL Training:  []  (47253) Provided self-care/home management training related to activities of daily living and compensatory training, and/or use of adaptive equipment   [] Comments:     Splinting:  []

## 2018-10-10 ENCOUNTER — HOSPITAL ENCOUNTER (OUTPATIENT)
Dept: OCCUPATIONAL THERAPY | Age: 48
Setting detail: THERAPIES SERIES
End: 2018-10-10
Payer: COMMERCIAL

## 2018-10-30 ENCOUNTER — HOSPITAL ENCOUNTER (OUTPATIENT)
Dept: CT IMAGING | Age: 48
Discharge: HOME | End: 2018-10-30
Payer: COMMERCIAL

## 2018-10-30 DIAGNOSIS — A31.9 MYCOBACTERIAL INFECTION (EXCLUDING TUBERCULOSIS AND LEPROSY): ICD-10-CM

## 2018-10-30 PROCEDURE — 6360000004 HC RX CONTRAST MEDICATION: Performed by: INTERNAL MEDICINE

## 2018-10-30 PROCEDURE — 71260 CT THORAX DX C+: CPT

## 2018-10-30 RX ADMIN — IOPAMIDOL 80 ML: 755 INJECTION, SOLUTION INTRAVENOUS at 08:28

## 2018-11-12 ENCOUNTER — OFFICE VISIT (OUTPATIENT)
Dept: ORTHOPEDIC SURGERY | Age: 48
End: 2018-11-12
Payer: COMMERCIAL

## 2018-11-12 VITALS — HEIGHT: 67 IN | WEIGHT: 235.01 LBS | BODY MASS INDEX: 36.89 KG/M2 | RESPIRATION RATE: 19 BRPM

## 2018-11-12 DIAGNOSIS — S52.571D OTHER CLOSED INTRA-ARTICULAR FRACTURE OF DISTAL END OF RIGHT RADIUS WITH ROUTINE HEALING, SUBSEQUENT ENCOUNTER: Primary | ICD-10-CM

## 2018-11-12 DIAGNOSIS — M65.4 DE QUERVAIN'S TENOSYNOVITIS: ICD-10-CM

## 2018-11-12 PROCEDURE — 99213 OFFICE O/P EST LOW 20 MIN: CPT | Performed by: ORTHOPAEDIC SURGERY

## 2018-11-12 PROCEDURE — 20550 NJX 1 TENDON SHEATH/LIGAMENT: CPT | Performed by: ORTHOPAEDIC SURGERY

## 2018-11-12 PROCEDURE — L3908 WHO COCK-UP NONMOLDE PRE OTS: HCPCS | Performed by: ORTHOPAEDIC SURGERY

## 2018-11-12 NOTE — PROGRESS NOTES
Assessment: Persistent radial sided aching discomfort particularly when writing or moving her wrist in a certain direction after 4 months post distal radius fracture open reduction internal fixation. Possible element of de Quervain's tenosynovitis    Treatment Plan: We are going to give her steroid injection in the 1st dorsal compartment of the lower right wrist today both diagnostically as well as therapeutically. I don't see any evidence of this being tenderness over her hardware. Return in about 3 weeks (around 12/3/2018). History of Present Illness  Mike Ramos is a 53 y.o. female. Location:  Radial aspect of the right wrist Severity: It's nearly constant aching discomfort but when she has to write a lot or moves her hand in a certain direction it's a sharp pain Duration: As been happening ever since her fracture   Modifying factors: Not moving the wrist or thumb Associated symptoms: No real associated numbness or tingling    Review of Systems  Pertinent items are noted in HPI  Denies fever chills, confusion and bowel and bladder active change  Complete Review of Systems reviewed from patient history form dated 2018 and available in the patients chart under the media tab. Vital Signs  Vitals:    11/12/18 1335   Resp: 19   Weight: 235 lb 0.2 oz (106.6 kg)   Height: 5' 7.01\" (1.702 m)     Body mass index is 36.8 kg/m².      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 [address] 2001    DVT (deep venous thrombosis) (HCC)     Factor VIII deficiency (HCC)     History of blood transfusion     possibly with stem cell and surgeries    History of shingles     Hodgkin's disease ([address] 1999    Hx of blood clots     Obesity, Class II, BMI 35-39.9, with comorbidity     SVT (supraventricular tachycardia) ([address]     1 occurance 2008     Current Outpatient Prescriptions on File

## 2018-11-12 NOTE — PROGRESS NOTES
Injection adminstration details:  Date & Time: 11/12/18 1:57 PM  Site & Comments:Right wrist deQuervains injection administered by Dr. Nadia Zhu    Betamethasone (30 mg/ml)  NDC:  9777-7636-00  Lot Number: [id number]  EXP: 04/2020    1% Xylocaine (10mg/ml)  NDC:  20754-067-01  Lot Number: [id number]  EXP: 09/21

## 2018-11-19 ENCOUNTER — HOSPITAL ENCOUNTER (EMERGENCY)
Age: 48
Discharge: HOME | End: 2018-11-19
Attending: EMERGENCY MEDICINE
Payer: COMMERCIAL

## 2018-11-19 ENCOUNTER — APPOINTMENT (OUTPATIENT)
Dept: GENERAL RADIOLOGY | Age: 48
End: 2018-11-19
Payer: COMMERCIAL

## 2018-11-19 VITALS
OXYGEN SATURATION: 97 % | DIASTOLIC BLOOD PRESSURE: 82 MMHG | HEART RATE: 86 BPM | WEIGHT: 235 LBS | BODY MASS INDEX: 36.88 KG/M2 | RESPIRATION RATE: 18 BRPM | TEMPERATURE: 98.5 F | SYSTOLIC BLOOD PRESSURE: 115 MMHG | HEIGHT: 67 IN

## 2018-11-19 DIAGNOSIS — R07.9 CHEST PAIN, UNSPECIFIED TYPE: Primary | ICD-10-CM

## 2018-11-19 LAB
A/G RATIO: 1.2 (ref 1.1–2.2)
ALBUMIN SERPL-MCNC: 3.7 G/DL (ref 3.4–5)
ALP BLD-CCNC: 104 U/L (ref 40–129)
ALT SERPL-CCNC: 25 U/L (ref 10–40)
ANION GAP SERPL CALCULATED.3IONS-SCNC: 10 MMOL/L (ref 3–16)
AST SERPL-CCNC: 23 U/L (ref 15–37)
BASOPHILS ABSOLUTE: 0 K/UL (ref 0–0.2)
BASOPHILS RELATIVE PERCENT: 0.3 %
BILIRUB SERPL-MCNC: <0.2 MG/DL (ref 0–1)
BUN BLDV-MCNC: 14 MG/DL (ref 7–20)
CALCIUM SERPL-MCNC: 9.2 MG/DL (ref 8.3–10.6)
CHLORIDE BLD-SCNC: 103 MMOL/L (ref 99–110)
CO2: 26 MMOL/L (ref 21–32)
CREAT SERPL-MCNC: 0.6 MG/DL (ref 0.6–1.1)
D DIMER: <200 NG/ML DDU (ref 0–229)
EKG ATRIAL RATE: 96 BPM
EKG DIAGNOSIS: NORMAL
EKG P AXIS: 60 DEGREES
EKG P-R INTERVAL: 168 MS
EKG Q-T INTERVAL: 360 MS
EKG QRS DURATION: 88 MS
EKG QTC CALCULATION (BAZETT): 454 MS
EKG R AXIS: 31 DEGREES
EKG T AXIS: 68 DEGREES
EKG VENTRICULAR RATE: 96 BPM
EOSINOPHILS ABSOLUTE: 0.1 K/UL (ref 0–0.6)
EOSINOPHILS RELATIVE PERCENT: 1.1 %
GFR AFRICAN AMERICAN: >60
GFR NON-AFRICAN AMERICAN: >60
GLOBULIN: 3 G/DL
GLUCOSE BLD-MCNC: 126 MG/DL (ref 70–99)
HCT VFR BLD CALC: 39.6 % (ref 36–48)
HEMOGLOBIN: 12.9 G/DL (ref 12–16)
INR BLD: 1.67 (ref 0.86–1.14)
LV EF: 58 %
LVEF MODALITY: NORMAL
LYMPHOCYTES ABSOLUTE: 2 K/UL (ref 1–5.1)
LYMPHOCYTES RELATIVE PERCENT: 16.2 %
MCH RBC QN AUTO: 28.5 PG (ref 26–34)
MCHC RBC AUTO-ENTMCNC: 32.5 G/DL (ref 31–36)
MCV RBC AUTO: 87.5 FL (ref 80–100)
MONOCYTES ABSOLUTE: 0.8 K/UL (ref 0–1.3)
MONOCYTES RELATIVE PERCENT: 7.1 %
NEUTROPHILS ABSOLUTE: 9.1 K/UL (ref 1.7–7.7)
NEUTROPHILS RELATIVE PERCENT: 75.3 %
PDW BLD-RTO: 14.7 % (ref 12.4–15.4)
PLATELET # BLD: 253 K/UL (ref 135–450)
PMV BLD AUTO: 7.3 FL (ref 5–10.5)
POTASSIUM REFLEX MAGNESIUM: 4.5 MMOL/L (ref 3.5–5.1)
PROTHROMBIN TIME: 19 SEC (ref 9.8–13)
RBC # BLD: 4.52 M/UL (ref 4–5.2)
SEDIMENTATION RATE, ERYTHROCYTE: 49 MM/HR (ref 0–20)
SODIUM BLD-SCNC: 139 MMOL/L (ref 136–145)
TOTAL PROTEIN: 6.7 G/DL (ref 6.4–8.2)
TROPONIN: <0.01 NG/ML
WBC # BLD: 12 K/UL (ref 4–11)

## 2018-11-19 PROCEDURE — 84484 ASSAY OF TROPONIN QUANT: CPT

## 2018-11-19 PROCEDURE — 85610 PROTHROMBIN TIME: CPT

## 2018-11-19 PROCEDURE — 85652 RBC SED RATE AUTOMATED: CPT

## 2018-11-19 PROCEDURE — C8929 TTE W OR WO FOL WCON,DOPPLER: HCPCS

## 2018-11-19 PROCEDURE — 36415 COLL VENOUS BLD VENIPUNCTURE: CPT

## 2018-11-19 PROCEDURE — 96375 TX/PRO/DX INJ NEW DRUG ADDON: CPT

## 2018-11-19 PROCEDURE — 6360000002 HC RX W HCPCS: Performed by: EMERGENCY MEDICINE

## 2018-11-19 PROCEDURE — 93005 ELECTROCARDIOGRAM TRACING: CPT | Performed by: EMERGENCY MEDICINE

## 2018-11-19 PROCEDURE — 80053 COMPREHEN METABOLIC PANEL: CPT

## 2018-11-19 PROCEDURE — 71046 X-RAY EXAM CHEST 2 VIEWS: CPT

## 2018-11-19 PROCEDURE — 85025 COMPLETE CBC W/AUTO DIFF WBC: CPT

## 2018-11-19 PROCEDURE — 85379 FIBRIN DEGRADATION QUANT: CPT

## 2018-11-19 PROCEDURE — 6360000004 HC RX CONTRAST MEDICATION: Performed by: INTERNAL MEDICINE

## 2018-11-19 PROCEDURE — 6370000000 HC RX 637 (ALT 250 FOR IP): Performed by: EMERGENCY MEDICINE

## 2018-11-19 PROCEDURE — 99284 EMERGENCY DEPT VISIT MOD MDM: CPT

## 2018-11-19 PROCEDURE — 96374 THER/PROPH/DIAG INJ IV PUSH: CPT

## 2018-11-19 RX ORDER — KETOROLAC TROMETHAMINE 30 MG/ML
30 INJECTION, SOLUTION INTRAMUSCULAR; INTRAVENOUS ONCE
Status: COMPLETED | OUTPATIENT
Start: 2018-11-19 | End: 2018-11-19

## 2018-11-19 RX ORDER — GUAIFENESIN/DEXTROMETHORPHAN 100-10MG/5
5 SYRUP ORAL 3 TIMES DAILY PRN
Qty: 120 ML | Refills: 0 | Status: SHIPPED | OUTPATIENT
Start: 2018-11-19 | End: 2018-11-29

## 2018-11-19 RX ORDER — AZITHROMYCIN 1 G
1 PACKET (EA) ORAL ONCE
COMMUNITY
End: 2019-10-14

## 2018-11-19 RX ORDER — OXYCODONE HYDROCHLORIDE AND ACETAMINOPHEN 5; 325 MG/1; MG/1
2 TABLET ORAL ONCE
Status: COMPLETED | OUTPATIENT
Start: 2018-11-19 | End: 2018-11-19

## 2018-11-19 RX ORDER — OXYCODONE HYDROCHLORIDE AND ACETAMINOPHEN 5; 325 MG/1; MG/1
1 TABLET ORAL EVERY 6 HOURS PRN
Qty: 20 TABLET | Refills: 0 | Status: SHIPPED | OUTPATIENT
Start: 2018-11-19 | End: 2018-11-26

## 2018-11-19 RX ADMIN — PERFLUTREN 2.2 MG: 6.52 INJECTION, SUSPENSION INTRAVENOUS at 13:23

## 2018-11-19 RX ADMIN — KETOROLAC TROMETHAMINE 30 MG: 30 INJECTION, SOLUTION INTRAMUSCULAR at 09:24

## 2018-11-19 RX ADMIN — OXYCODONE HYDROCHLORIDE AND ACETAMINOPHEN 2 TABLET: 5; 325 TABLET ORAL at 15:02

## 2018-11-19 ASSESSMENT — PAIN DESCRIPTION - PAIN TYPE
TYPE: ACUTE PAIN
TYPE: ACUTE PAIN

## 2018-11-19 ASSESSMENT — PAIN DESCRIPTION - DESCRIPTORS
DESCRIPTORS: ACHING
DESCRIPTORS: ACHING

## 2018-11-19 ASSESSMENT — ENCOUNTER SYMPTOMS
GASTROINTESTINAL NEGATIVE: 1
COUGH: 1
EYES NEGATIVE: 1

## 2018-11-19 ASSESSMENT — PAIN DESCRIPTION - LOCATION
LOCATION: CHEST
LOCATION: CHEST

## 2018-11-19 ASSESSMENT — PAIN SCALES - GENERAL
PAINLEVEL_OUTOF10: 2
PAINLEVEL_OUTOF10: 10
PAINLEVEL_OUTOF10: 8
PAINLEVEL_OUTOF10: 5

## 2018-11-19 ASSESSMENT — PAIN DESCRIPTION - PROGRESSION: CLINICAL_PROGRESSION: GRADUALLY IMPROVING

## 2018-11-19 ASSESSMENT — PAIN DESCRIPTION - FREQUENCY
FREQUENCY: CONTINUOUS
FREQUENCY: INTERMITTENT

## 2018-11-19 NOTE — ED PROVIDER NOTES
[address] [place]          ATTENDING PHYSICIAN NOTE       Date of evaluation: 11/19/2018    Chief Complaint     Chest Congestion and Rib Pain (left )      History of Present Illness     Parag Mckoy is a 53 y.o. female who presents with Left-sided chest pain which is worse with movements. Over the past 2 weeks she's had a cold with congestion and has had a fair amount of coughing. She stated she was bringing up greenish sputum so started taking Z-Vince last week and her final doses today. She states the green sputum has been cleared up but she is coughing up white sputum now. 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 She's also had a history of pericarditis secondary Hodgkin's disease. Review of Systems     Review of Systems   Constitutional: Negative for activity change, appetite change, chills, diaphoresis, fatigue, fever and unexpected weight change. HENT: Negative. Eyes: Negative. Respiratory: Positive for cough. Left-sided chest pain underneath her left breast which is exacerbated by movement   Cardiovascular: Negative. Gastrointestinal: Negative. Endocrine: Negative. Genitourinary: Negative. Musculoskeletal: Negative. Neurological: Negative. Hematological: Negative. Psychiatric/Behavioral: Negative. Past Medical, Surgical, Family, and Social History     She has a past medical history of Acid fast bacillus; AML (acute myeloblastic leukemia) ([address] DVT (deep venous thrombosis) ([address] Factor VIII deficiency ([address] History of blood transfusion; History of shingles; Hodgkin's disease ([address] Hx of blood clots; Obesity, Class II, BMI 35-39.9, with comorbidity; and SVT (supraventricular tachycardia) ([address] She has a past surgical history that includes bone marrow transplant (2001); Dilation and curettage of uterus; Pericardium surgery (1999); thoracotomy (Left, 1999);  Mediastinoscopy (1999); some mild ST elevation in 23 and aVF. It also appears she has [place] depression in aVF and lead 2. RADIOLOGY:  XR CHEST STANDARD (2 VW)   Final Result   Impression: No acute cardiopulmonary abnormality.           LABS:   Results for orders placed or performed during the hospital encounter of 11/19/18   PT - INR   Result Value Ref Range    Protime 19.0 (H) 9.8 - 13.0 sec    INR 1.67 (H) 0.86 - 1.14   CBC auto differential   Result Value Ref Range    WBC 12.0 (H) 4.0 - 11.0 K/uL    RBC 4.52 4.00 - 5.20 M/uL    Hemoglobin 12.9 12.0 - 16.0 g/dL    Hematocrit 39.6 36.0 - 48.0 %    MCV 87.5 80.0 - 100.0 fL    MCH 28.5 26.0 - 34.0 pg    MCHC 32.5 31.0 - 36.0 g/dL    RDW 14.7 12.4 - 15.4 %    Platelets 578 891 - 794 K/uL    MPV 7.3 5.0 - 10.5 fL    Neutrophils % 75.3 %    Lymphocytes % 16.2 %    Monocytes % 7.1 %    Eosinophils % 1.1 %    Basophils % 0.3 %    Neutrophils # 9.1 (H) 1.7 - 7.7 K/uL    Lymphocytes # 2.0 1.0 - 5.1 K/uL    Monocytes # 0.8 0.0 - 1.3 K/uL    Eosinophils # 0.1 0.0 - 0.6 K/uL    Basophils # 0.0 0.0 - 0.2 K/uL   Sedimentation Rate   Result Value Ref Range    Sed Rate 49 (H) 0 - 20 mm/Hr   Comprehensive Metabolic Panel w/ Reflex to MG   Result Value Ref Range    Sodium 139 136 - 145 mmol/L    Potassium reflex Magnesium 4.5 3.5 - 5.1 mmol/L    Chloride 103 99 - 110 mmol/L    CO2 26 21 - 32 mmol/L    Anion Gap 10 3 - 16    Glucose 126 (H) 70 - 99 mg/dL    BUN 14 7 - 20 mg/dL    CREATININE 0.6 0.6 - 1.1 mg/dL    GFR Non-African American >60 >60    GFR African American >60 >60    Calcium 9.2 8.3 - 10.6 mg/dL    Total Protein 6.7 6.4 - 8.2 g/dL    Alb 3.7 3.4 - 5.0 g/dL    Albumin/Globulin Ratio 1.2 1.1 - 2.2    Total Bilirubin <0.2 0.0 - 1.0 mg/dL    Alkaline Phosphatase 104 40 - 129 U/L    ALT 25 10 - 40 U/L    AST 23 15 - 37 U/L    Globulin 3.0 g/dL   Troponin   Result Value Ref Range    Troponin <0.01 <0.01 ng/mL   D-dimer, quantitative (Lab)   Result Value Ref Range    D-Dimer, Quant <200 0 - 229 ng/mL DDU   EKG 12 Lead   Result Value Ref Range    Ventricular Rate 96 BPM    Atrial Rate 96 BPM    P-R Interval 168 ms    QRS Duration 88 ms    Q-T Interval 360 ms    QTc Calculation (Bazett) 454 ms    P Axis 60 degrees    R Axis 31 degrees    T Axis 68 degrees    Diagnosis       EKG performed in ER and to be interpreted by ER physician. Confirmed by MD, ER ([id number]),  Conor intermediate ([id number]) on 11/19/2018 1:06:55 PM           RECENT VITALS:  BP: 104/67,Temp: 98.5 °F (36.9 °C), Pulse: 92, Resp: 18, SpO2: 96 %     Procedures         ED Course     Nursing Notes, Past Medical Hx, Past Surgical Hx, Social Hx,Allergies, and Family Hx were reviewed. The patient was given the following medications:  Orders Placed This Encounter   Medications    ketorolac (TORADOL) injection 30 mg       CONSULTS:  None    MEDICAL DECISIONMAKING / ASSESSMENT / Connie Florida is a 53 y.o. female presents with left-sided chest pain that is consistent with musculoskeletal pain however her EKG is concerning for possible pericarditis. .  She has had a previous cardiac window placed for a pericardial effusion. An Echo today was unremarkable for a pericardial effusion. Her trop was negative and her dimer was normal. CXR read negative. Her history and exam suggest musculoskeletal pain. No rash noted for Zoster. I spoke to her about her low INR and she will increase her coumadin. I started her on percocet for pain and also on cough syrup. Clinical Impression     1. Chest pain, unspecified type        Disposition     PATIENT REFERRED TO:  No follow-up provider specified. DISCHARGE MEDICATIONS:  New Prescriptions    No medications on file       DISPOSITION  Discharged home.        Jamel Talbot MD  11/21/18 [id number]

## 2018-12-17 ENCOUNTER — OFFICE VISIT (OUTPATIENT)
Dept: ORTHOPEDIC SURGERY | Age: 48
End: 2018-12-17
Payer: COMMERCIAL

## 2018-12-17 VITALS — HEIGHT: 67 IN | WEIGHT: 235.01 LBS | BODY MASS INDEX: 36.89 KG/M2

## 2018-12-17 DIAGNOSIS — S52.571D OTHER CLOSED INTRA-ARTICULAR FRACTURE OF DISTAL END OF RIGHT RADIUS WITH ROUTINE HEALING, SUBSEQUENT ENCOUNTER: Primary | ICD-10-CM

## 2018-12-17 PROCEDURE — 99213 OFFICE O/P EST LOW 20 MIN: CPT | Performed by: ORTHOPAEDIC SURGERY

## 2019-10-13 ENCOUNTER — HOSPITAL ENCOUNTER (EMERGENCY)
Age: 49
Discharge: HOME | End: 2019-10-14
Attending: EMERGENCY MEDICINE
Payer: COMMERCIAL

## 2019-10-13 DIAGNOSIS — S61.311A LACERATION OF LEFT INDEX FINGER WITH DAMAGE TO NAIL, FOREIGN BODY PRESENCE UNSPECIFIED, INITIAL ENCOUNTER: Primary | ICD-10-CM

## 2019-10-13 PROCEDURE — 99282 EMERGENCY DEPT VISIT SF MDM: CPT

## 2019-10-14 VITALS
TEMPERATURE: 98.6 F | RESPIRATION RATE: 16 BRPM | HEART RATE: 94 BPM | WEIGHT: 230 LBS | BODY MASS INDEX: 36.1 KG/M2 | DIASTOLIC BLOOD PRESSURE: 86 MMHG | HEIGHT: 67 IN | SYSTOLIC BLOOD PRESSURE: 140 MMHG | OXYGEN SATURATION: 97 %

## 2019-10-14 PROCEDURE — 90471 IMMUNIZATION ADMIN: CPT | Performed by: STUDENT IN AN ORGANIZED HEALTH CARE EDUCATION/TRAINING PROGRAM

## 2019-10-14 PROCEDURE — 90715 TDAP VACCINE 7 YRS/> IM: CPT | Performed by: STUDENT IN AN ORGANIZED HEALTH CARE EDUCATION/TRAINING PROGRAM

## 2019-10-14 PROCEDURE — 6360000002 HC RX W HCPCS: Performed by: STUDENT IN AN ORGANIZED HEALTH CARE EDUCATION/TRAINING PROGRAM

## 2019-10-14 RX ADMIN — TETANUS TOXOID, REDUCED DIPHTHERIA TOXOID AND ACELLULAR PERTUSSIS VACCINE, ADSORBED 0.5 ML: 5; 2.5; 8; 8; 2.5 SUSPENSION INTRAMUSCULAR at 00:42

## 2019-10-14 ASSESSMENT — PAIN DESCRIPTION - ORIENTATION: ORIENTATION: LEFT

## 2019-10-14 ASSESSMENT — ENCOUNTER SYMPTOMS
ABDOMINAL PAIN: 0
SHORTNESS OF BREATH: 0
EYE PAIN: 0

## 2019-10-14 ASSESSMENT — PAIN DESCRIPTION - PAIN TYPE: TYPE: ACUTE PAIN

## 2019-10-14 ASSESSMENT — PAIN DESCRIPTION - ONSET: ONSET: ON-GOING

## 2019-10-14 ASSESSMENT — PAIN DESCRIPTION - DESCRIPTORS: DESCRIPTORS: ACHING;SORE

## 2019-10-14 ASSESSMENT — PAIN SCALES - GENERAL: PAINLEVEL_OUTOF10: 1

## 2019-10-14 ASSESSMENT — PAIN DESCRIPTION - LOCATION: LOCATION: FINGER (COMMENT WHICH ONE)

## 2019-10-14 ASSESSMENT — PAIN DESCRIPTION - FREQUENCY: FREQUENCY: INTERMITTENT

## 2020-01-15 ENCOUNTER — HOSPITAL ENCOUNTER (OUTPATIENT)
Dept: GENERAL RADIOLOGY | Age: 50
Discharge: HOME | End: 2020-01-15
Payer: COMMERCIAL

## 2020-01-15 PROCEDURE — 77080 DXA BONE DENSITY AXIAL: CPT

## 2020-04-03 ENCOUNTER — HOSPITAL ENCOUNTER (EMERGENCY)
Age: 50
Discharge: HOME | End: 2020-04-03
Attending: EMERGENCY MEDICINE
Payer: COMMERCIAL

## 2020-04-03 VITALS
WEIGHT: 235 LBS | SYSTOLIC BLOOD PRESSURE: 121 MMHG | BODY MASS INDEX: 36.88 KG/M2 | RESPIRATION RATE: 16 BRPM | TEMPERATURE: 98.4 F | HEIGHT: 67 IN | DIASTOLIC BLOOD PRESSURE: 83 MMHG | OXYGEN SATURATION: 98 %

## 2020-04-03 LAB
BASOPHILS ABSOLUTE: 0.1 K/UL (ref 0–0.2)
BASOPHILS RELATIVE PERCENT: 0.8 %
EOSINOPHILS ABSOLUTE: 0.2 K/UL (ref 0–0.6)
EOSINOPHILS RELATIVE PERCENT: 2.2 %
HCT VFR BLD CALC: 40.7 % (ref 36–48)
HEMOGLOBIN: 13.8 G/DL (ref 12–16)
INR BLD: 2.32 (ref 0.86–1.14)
LYMPHOCYTES ABSOLUTE: 3 K/UL (ref 1–5.1)
LYMPHOCYTES RELATIVE PERCENT: 36.1 %
MCH RBC QN AUTO: 29.4 PG (ref 26–34)
MCHC RBC AUTO-ENTMCNC: 33.8 G/DL (ref 31–36)
MCV RBC AUTO: 87 FL (ref 80–100)
MONOCYTES ABSOLUTE: 0.6 K/UL (ref 0–1.3)
MONOCYTES RELATIVE PERCENT: 7.5 %
NEUTROPHILS ABSOLUTE: 4.5 K/UL (ref 1.7–7.7)
NEUTROPHILS RELATIVE PERCENT: 53.4 %
PDW BLD-RTO: 14.4 % (ref 12.4–15.4)
PLATELET # BLD: 249 K/UL (ref 135–450)
PMV BLD AUTO: 7.7 FL (ref 5–10.5)
PROTHROMBIN TIME: 27.1 SEC (ref 10–13.2)
RBC # BLD: 4.67 M/UL (ref 4–5.2)
WBC # BLD: 8.4 K/UL (ref 4–11)

## 2020-04-03 PROCEDURE — 85025 COMPLETE CBC W/AUTO DIFF WBC: CPT

## 2020-04-03 PROCEDURE — 99283 EMERGENCY DEPT VISIT LOW MDM: CPT

## 2020-04-03 PROCEDURE — 85610 PROTHROMBIN TIME: CPT

## 2020-04-03 ASSESSMENT — PAIN SCALES - GENERAL: PAINLEVEL_OUTOF10: 4

## 2020-04-03 ASSESSMENT — PAIN DESCRIPTION - LOCATION: LOCATION: ARM

## 2020-04-03 ASSESSMENT — PAIN DESCRIPTION - PAIN TYPE: TYPE: ACUTE PAIN

## 2020-04-03 ASSESSMENT — PAIN DESCRIPTION - ORIENTATION: ORIENTATION: RIGHT

## 2020-04-03 ASSESSMENT — PAIN DESCRIPTION - DESCRIPTORS: DESCRIPTORS: SHARP

## 2020-04-03 ASSESSMENT — PAIN DESCRIPTION - FREQUENCY: FREQUENCY: CONTINUOUS

## 2020-04-03 NOTE — ED PROVIDER NOTES
[facility] EMERGENCY DEPT VISIT      Patient Identification  Maria De Jesus Delarosa is a 53 y.o. female. Chief Complaint   Arm Pain      History of Present Illness: This is a  53 y.o. female who presents ambulatory  to the ED with complaints of 2 day h/o right bicep area pain. Has h/o clotting disorder and this pain feels similar to when she has had blood clots in past. Used to have cueto catheter on that side which as been removed. No recent right arm IVs or PICCs. No injury to arm. No redness or swelling. No numbness or weakness. No neck pain. No fever. On coumadin but stopped for a while in March to have dental work and has not been checked since then around 3/19.      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    AML (acute myeloblastic leukemia) ([address] 2001    DVT (deep venous thrombosis) (HCC)     Factor VIII deficiency (HCC)     History of blood transfusion     possibly with stem cell and surgeries    History of shingles     Hodgkin's disease ([address] 1999    Hodgkin's lymphoma ([address]     Hx of blood clots     Obesity, Class II, BMI 35-39.9, with comorbidity     SVT (supraventricular tachycardia) ([address]     1 occurance 2008       Past Surgical History:   Procedure Laterality Date    BONE MARROW TRANSPLANT  2001    CARPAL TUNNEL RELEASE Right 2013    DENTAL SURGERY      Back right molar removed    DILATION AND CURETTAGE OF UTERUS      x2    LUNG SURGERY  2017    had left upper lobe removed d/t infection    Deion Edwards - pericardial window   Abigail Edwards    THORACOTOMY Left 07/13/2017    Dr. Nydia Edwards - via VATS w/minithoracotomy for removal of RONNY, mobilization from L lung adhesions, biopsy    TUNNELED VENOUS CATHETER PLACEMENT Right 2001    since removed    TUNNELED VENOUS PORT PLACEMENT Left 1999    MARY since removed    WISDOM TOOTH EXTRACTION      WRIST SURGERY  2018    pins and plates right wrist        No current facility-administered medications for this encounter.      Current Outpatient Medications:     warfarin (COUMADIN) 5 MG tablet, Take 1 tablet by mouth daily Or as directed by MD (Patient taking differently: Take 4 mg by mouth daily Or as directed by MD takes 5 mg on Sunday and wed), Disp: 30 tablet, Rfl: 1    sertraline (ZOLOFT) 50 MG tablet, TAKE ONE TABLET BY MOUTH DAILY, Disp: 30 tablet, Rfl: 2    Allergies   Allergen Reactions    Rifabutin Other (See Comments)     Causes High Fevers    Morphine Nausea And Vomiting       Social History     Socioeconomic History    Marital status: Single     Spouse name: Not on file    Number of children: Not on file    Years of education: Not on file    Highest education level: Not on file   Occupational History    Not on file   Social Needs    Financial resource strain: Not on file    Food insecurity     Worry: Not on file     Inability: Not on file    Transportation needs     Medical: Not on file     Non-medical: Not on file   Tobacco Use    Smoking status: Never Smoker    Smokeless tobacco: Never Used   Substance and Sexual Activity    Alcohol use: No    Drug use: No    Sexual activity: Not Currently   Lifestyle    Physical activity     Days per week: Not on file     Minutes per session: Not on file    Stress: Not on file   Relationships    Social connections     Talks on phone: Not on file     Gets together: Not on file     Attends Protestant service: Not on file     Active member of club or organization: Not on file     Attends meetings of clubs or organizations: Not on file     Relationship status: Not on file    Intimate partner violence     Fear of current or ex partner: Not on file     Emotionally abused: Not on file     Physically abused: Not on file     Forced sexual activity: Not on file   Other Topics Concern    Not on file   Social History Narrative    Not on file       Nursing Notes Reviewed      ROS:  General: no fever  ENT: no sinus congestion, no sore throat  RESP: no cough, no shortness of breath  CARDIAC: no chest pain  GI: no abdominal pain, no vomiting, no diarrhea  Musculoskeletal: no arthralgia, no myalgia, no back pain, no neck pain, no joint swelling  NEURO:  no numbness, no weakness  DERM: no rash, no erythema, no ecchymosis, no wounds      PHYSICAL EXAM:  GENERAL APPEARANCE: Echo Fernandez is in no acute respiratory distress. Awake and alert. VITAL SIGNS:   ED Triage Vitals   Enc Vitals Group      BP       Pulse       Resp       Temp       Temp src       SpO2       Weight       Height       Head Circumference       Peak Flow       Pain Score       Pain Loc       Pain Edu? Excl. in [address]? HEAD: Normocephalic, atraumatic. EYES:  Extraocular muscles are intact. Conjunctivas are pink. Negative scleral icterus. ENT:  Mucous membranes are moist.  Pharynx without erythema or exudates. NECK: Nontender and supple. CHEST: Clear to auscultation bilaterally. No rales, rhonchi, or wheezing. HEART:  Regular rate and rhythm. No murmurs. Strong and equal pulses in the upper and lower extremities. ABDOMEN: Soft,  nondistended, positive bowel sounds. abdomen is nontender. MUSCULOSKELETAL:  Active range of motion of the upper and lower extremities. No edema. Right medial biceps region tender. No redness or cords. No arm swelling, redness or warmth. Strong radial pulse good cap refill. NEUROLOGICAL: Awake, alert and oriented x 3. Power intact in the upper and lower extremities. Sensation intact to upper extremities  DERMATOLOGIC: No petechiae, rashes, or ecchymoses. ED COURSE AND MEDICAL DECISION MAKING:      Radiology:  All plain films have been evaluated by myself. They may have been overread by radiologist as noted in chart.  Other radiologic studies (i.e. CT, MRI, ultrasounds, etc ) have been interpreted by radiologist. new or worsening symptoms occur. Clinical Impression:  1. Right arm pain        Dispo:  Patient will be discharged  at this time. Patient was informed of this decision and agrees with plan. I have discussed lab and xray findings with patient and they understand. Questions were answered to the best of my ability. Discharge vitals:  Blood pressure 121/83, temperature 98.4 °F (36.9 °C), resp. rate 16, height 5' 7\" (1.702 m), weight 106.6 kg (235 lb), last menstrual period 02/05/2016, SpO2 98 %, not currently breastfeeding. Prescriptions given:   New Prescriptions    No medications on file         This chart was created using dragon voice recognition software.         Jurgen Maciel MD  04/03/20 [id number]

## 2020-04-04 ENCOUNTER — CARE COORDINATION (OUTPATIENT)
Dept: CARE COORDINATION | Age: 50
End: 2020-04-04

## 2020-04-17 ENCOUNTER — CARE COORDINATION (OUTPATIENT)
Dept: CARE COORDINATION | Age: 50
End: 2020-04-17

## 2020-09-12 ENCOUNTER — HOSPITAL ENCOUNTER (EMERGENCY)
Age: 50
Discharge: HOME | End: 2020-09-12
Attending: EMERGENCY MEDICINE
Payer: COMMERCIAL

## 2020-09-12 ENCOUNTER — APPOINTMENT (OUTPATIENT)
Dept: CT IMAGING | Age: 50
End: 2020-09-12
Payer: COMMERCIAL

## 2020-09-12 VITALS
SYSTOLIC BLOOD PRESSURE: 163 MMHG | BODY MASS INDEX: 35.55 KG/M2 | RESPIRATION RATE: 13 BRPM | WEIGHT: 240 LBS | OXYGEN SATURATION: 97 % | TEMPERATURE: 98.8 F | DIASTOLIC BLOOD PRESSURE: 102 MMHG | HEART RATE: 83 BPM | HEIGHT: 69 IN

## 2020-09-12 LAB
INR BLD: 2.97 (ref 0.86–1.14)
PROTHROMBIN TIME: 34.8 SEC (ref 10–13.2)

## 2020-09-12 PROCEDURE — 70450 CT HEAD/BRAIN W/O DYE: CPT

## 2020-09-12 PROCEDURE — 93005 ELECTROCARDIOGRAM TRACING: CPT | Performed by: STUDENT IN AN ORGANIZED HEALTH CARE EDUCATION/TRAINING PROGRAM

## 2020-09-12 PROCEDURE — 99284 EMERGENCY DEPT VISIT MOD MDM: CPT

## 2020-09-12 PROCEDURE — 85610 PROTHROMBIN TIME: CPT

## 2020-09-12 NOTE — ED PROVIDER NOTES
[facility]  EMERGENCY DEPARTMENT ENCOUNTER          [facility] RESIDENT NOTE       Date of evaluation: 9/12/2020    Chief Complaint     Dizziness; Arm Pain; and Headache      History of Present Illness     Jer Solis is a 53 y.o. female PMHx of AFB, AML, DVT, Factor VIII deficiency who presents with posterior scalp pain as well as arm pain. Patient states that today in the afternoon she noticed that her posterior head scalp was hurting  when she was getting a haircut. She describes the pain to be very sore. It started with pain on palpation however now is constant. Otherwise she also endorses 2-week history of vertigo without identifiable trigger that would resolve in 1 to 2 minutes. Patient also endorses 1 week history of left medial arm pain with extension of her neck that is shooting in quality like a pinched nerve. On Wednesday she got a PT/INR where an INR was supratherapeutic at 3.3 but was advised to continue taking Coumadin without skipping a dose. She denies fever, chills, nausea vomiting, abdominal pain, shortness of breath, chest pain, dysuria, very vision, focal neurologic deficits. Review of Systems     Review of Systems   All other systems reviewed and are negative. Past Medical, Surgical, Family, and Social History     She has a past medical history of Acid fast bacillus, AML (acute myeloblastic leukemia) ([address] DVT (deep venous thrombosis) ([address] Factor VIII deficiency ([address] History of blood transfusion, History of shingles, Hodgkin's disease ([address] Hodgkin's lymphoma ([address] Hx of blood clots, Obesity, Class II, BMI 35-39.9, with comorbidity, and SVT (supraventricular tachycardia) ([address] She has a past surgical history that includes bone marrow transplant (2001); Dilation and curettage of uterus; Pericardium surgery (1999); thoracotomy (Left, 1999); Mediastinoscopy (1999); Tunneled venous port placement (Left, 1999); Tunneled venous catheter placement (Right, 2001);  [place] tooth extraction; Dental surgery; Nasal septum surgery (1984); thoracotomy (Left, 07/13/2017); Carpal tunnel release (Right, 2013); Lung surgery (2017); and Wrist surgery (2018). Her family history includes Breast Cancer in her paternal grandmother; Cancer in her father; Cataracts in her father and mother; Glaucoma in her maternal grandmother; Heart Attack in her paternal grandfather; Heart Defect in her maternal grandfather; Heart Failure in her mother; Hypertension in her father and mother. She reports that she has never smoked. She has never used smokeless tobacco. She reports that she does not drink alcohol or use drugs. Medications     Previous Medications    SERTRALINE (ZOLOFT) 50 MG TABLET    TAKE ONE TABLET BY MOUTH DAILY    WARFARIN (COUMADIN) 5 MG TABLET    Take 1 tablet by mouth daily Or as directed by MD       Allergies     She is allergic to rifabutin and morphine. Physical Exam     INITIAL VITALS: BP: (!) 145/94, Temp: 98.8 °F (37.1 °C), Pulse: 94, Resp: 18, SpO2: 98 %   Physical Exam  Constitutional:       General: She is not in acute distress. HENT:      Head: Normocephalic and atraumatic. Comments: Faint erythema over the area of pain in the posterior scalp, however no significant lesions. Eyes:      Extraocular Movements: Extraocular movements intact. Pupils: Pupils are equal, round, and reactive to light. Cardiovascular:      Rate and Rhythm: Normal rate and regular rhythm. Heart sounds: No murmur. Pulmonary:      Effort: Pulmonary effort is normal. No respiratory distress. Breath sounds: Normal breath sounds. No wheezing or rales. Abdominal:      General: There is no distension. Palpations: Abdomen is soft. Tenderness: There is no abdominal tenderness. There is no guarding. Musculoskeletal:      Right lower leg: No edema. Left lower leg: No edema. Skin:     Comments: Tenderness on palpation over the posterior scalp.    Neurological: General: No focal deficit present. Mental Status: She is alert. Cranial Nerves: No cranial nerve deficit. Comments: Pain in the posterior scalp with passive as well as active range of motion of the neck  No sensory deficits, no motor deficits. Diagnostic Results     EKG   None performed    RADIOLOGY:  CT HEAD WO CONTRAST   Final Result      No acute intracranial hemorrhage or mass effect. LABS:   Results for orders placed or performed during the hospital encounter of 09/12/20   PT - INR   Result Value Ref Range    Protime 34.8 (H) 10.0 - 13.2 sec    INR 2.97 (H) 0.86 - 1.14       ED BEDSIDE ULTRASOUND:  None performed    RECENT VITALS:  BP: (!) 163/102, Temp: 98.8 °F (37.1 °C),Pulse: 83, Resp: 13, SpO2: 97 %     Procedures     None performed    ED Course     Nursing Notes, Past Medical Hx, Past Surgical Hx, Social Hx, Allergies, and FamilyHx were reviewed. The patient was giventhe following medications:  No orders of the defined types were placed in this encounter. CONSULTS:  None    MEDICAL DECISION MAKING / ASSESSMENT / PLAN     Immanuel Laurent is a 53 y.o. female past medical history significant for factor VIII deficiency, DVT, AML presenting with posterior scalp pain. On evaluation, patient did not have neuro deficits or red flag signs. PT/INR was obtained, with INR down to 2.97. This could be start of a new kind of scalp headache and unlikely to be an acute brain bleed. Option of obtaining CT head without contrast was offered. Patient decided to obtain CT head, which was negative for acute hemorrhage or mass-effect. For pain control, patient was advised to take Tylenol. Patient was also advised to follow-up with her primary care doctor, for follow-up of her left medial arm pain as well as her headache, especially if it gets worse. She would likely need an outpatient MRI of the cervical region for the pinched nerve-like pain.   Option of being treated with IV migraine cocktail at this ED visit was offered, however patient declined. Patient was in agreement and verbalized understanding and was discharged in a stable condition. This patient was also evaluated by the attending physician. All care plans were discussed and agreed upon. Clinical Impression     1.  Headache in back of head        Disposition     PATIENT REFERRED TO:  MD Ricky [address]  [phone]    Call in 1 day  If symptoms worsen      DISCHARGE MEDICATIONS:  New Prescriptions    No medications on file       DISPOSITION    Discharge home     Sheba Terry MD  Resident  09/12/20 [id number]

## 2020-09-13 LAB
EKG ATRIAL RATE: 86 BPM
EKG DIAGNOSIS: NORMAL
EKG P AXIS: 65 DEGREES
EKG P-R INTERVAL: 170 MS
EKG Q-T INTERVAL: 392 MS
EKG QRS DURATION: 90 MS
EKG QTC CALCULATION (BAZETT): 469 MS
EKG R AXIS: 12 DEGREES
EKG T AXIS: 62 DEGREES
EKG VENTRICULAR RATE: 86 BPM

## 2020-09-13 NOTE — ED NOTES
Patient discharged to home via family. Written discharge instructions reviewed with understanding. Copy of AVS sent home with patient. Patient able to walk from ED without assistance.        Aicha Orlando RN  09/12/20 [id number]

## 2020-10-26 ENCOUNTER — HOSPITAL ENCOUNTER (OUTPATIENT)
Dept: GENERAL RADIOLOGY | Age: 50
Discharge: HOME | End: 2020-10-26
Payer: COMMERCIAL

## 2020-10-26 PROCEDURE — 73030 X-RAY EXAM OF SHOULDER: CPT

## 2021-07-08 ENCOUNTER — HOSPITAL ENCOUNTER (OUTPATIENT)
Dept: MAMMOGRAPHY | Age: 51
Discharge: HOME | End: 2021-07-08
Payer: COMMERCIAL

## 2021-07-08 VITALS — HEIGHT: 69 IN | BODY MASS INDEX: 35.55 KG/M2 | WEIGHT: 240 LBS

## 2021-07-08 DIAGNOSIS — Z12.39 SCREENING BREAST EXAMINATION: ICD-10-CM

## 2021-07-08 PROCEDURE — 77063 BREAST TOMOSYNTHESIS BI: CPT

## 2021-12-04 ENCOUNTER — HOSPITAL ENCOUNTER (OUTPATIENT)
Dept: MRI IMAGING | Age: 51
Discharge: HOME | End: 2021-12-04
Payer: COMMERCIAL

## 2021-12-04 DIAGNOSIS — R60.0 EDEMA LEG: ICD-10-CM

## 2021-12-04 DIAGNOSIS — C81.90 HODGKIN LYMPHOMA, UNSPECIFIED HODGKIN LYMPHOMA TYPE, UNSPECIFIED BODY REGION (HCC): ICD-10-CM

## 2021-12-04 PROCEDURE — 73718 MRI LOWER EXTREMITY W/O DYE: CPT

## 2022-03-24 ENCOUNTER — TELEPHONE (OUTPATIENT)
Dept: ORTHOPEDIC SURGERY | Age: 52
End: 2022-03-24

## 2022-03-24 NOTE — TELEPHONE ENCOUNTER
Notified [facility] and [facility] staff regarding medical records for patient . Working on medical records ([place]) all for the patient.     Waiting for a signed released (e-mailed one to the patient)

## 2023-03-03 ENCOUNTER — HOSPITAL ENCOUNTER (OUTPATIENT)
Dept: MAMMOGRAPHY | Age: 53
Discharge: HOME | End: 2023-03-03
Payer: COMMERCIAL

## 2023-03-03 VITALS — BODY MASS INDEX: 35.55 KG/M2 | WEIGHT: 240 LBS | HEIGHT: 69 IN

## 2023-03-03 DIAGNOSIS — Z12.31 VISIT FOR SCREENING MAMMOGRAM: ICD-10-CM

## 2023-03-03 PROCEDURE — 77063 BREAST TOMOSYNTHESIS BI: CPT

## 2023-06-19 ENCOUNTER — HOSPITAL ENCOUNTER (OUTPATIENT)
Age: 53
Setting detail: OUTPATIENT SURGERY
Discharge: HOME | End: 2023-06-19
Attending: INTERNAL MEDICINE | Admitting: INTERNAL MEDICINE
Payer: COMMERCIAL

## 2023-06-19 VITALS
OXYGEN SATURATION: 94 % | SYSTOLIC BLOOD PRESSURE: 117 MMHG | WEIGHT: 224 LBS | TEMPERATURE: 96.9 F | RESPIRATION RATE: 16 BRPM | DIASTOLIC BLOOD PRESSURE: 89 MMHG | HEIGHT: 67 IN | BODY MASS INDEX: 35.16 KG/M2 | HEART RATE: 86 BPM

## 2023-06-19 LAB
GLUCOSE BLD-MCNC: 91 MG/DL (ref 70–99)
PERFORMED ON: NORMAL

## 2023-06-19 PROCEDURE — 6360000002 HC RX W HCPCS: Performed by: ANESTHESIOLOGY

## 2023-06-19 PROCEDURE — 6360000002 HC RX W HCPCS: Performed by: INTERNAL MEDICINE

## 2023-06-19 PROCEDURE — 7100000011 HC PHASE II RECOVERY - ADDTL 15 MIN: Performed by: INTERNAL MEDICINE

## 2023-06-19 PROCEDURE — 2709999900 HC NON-CHARGEABLE SUPPLY: Performed by: INTERNAL MEDICINE

## 2023-06-19 PROCEDURE — 99152 MOD SED SAME PHYS/QHP 5/>YRS: CPT | Performed by: INTERNAL MEDICINE

## 2023-06-19 PROCEDURE — 3609027000 HC COLONOSCOPY: Performed by: INTERNAL MEDICINE

## 2023-06-19 PROCEDURE — 7100000010 HC PHASE II RECOVERY - FIRST 15 MIN: Performed by: INTERNAL MEDICINE

## 2023-06-19 PROCEDURE — 2500000003 HC RX 250 WO HCPCS: Performed by: INTERNAL MEDICINE

## 2023-06-19 PROCEDURE — 99153 MOD SED SAME PHYS/QHP EA: CPT | Performed by: INTERNAL MEDICINE

## 2023-06-19 RX ORDER — FENTANYL CITRATE 50 UG/ML
INJECTION, SOLUTION INTRAMUSCULAR; INTRAVENOUS PRN
Status: DISCONTINUED | OUTPATIENT
Start: 2023-06-19 | End: 2023-06-19 | Stop reason: ALTCHOICE

## 2023-06-19 RX ORDER — CHLORAL HYDRATE 500 MG
2 CAPSULE ORAL 2 TIMES DAILY
COMMUNITY
Start: 2023-06-06

## 2023-06-19 RX ORDER — ONDANSETRON 2 MG/ML
4 INJECTION INTRAMUSCULAR; INTRAVENOUS ONCE
Status: COMPLETED | OUTPATIENT
Start: 2023-06-19 | End: 2023-06-19

## 2023-06-19 RX ORDER — SEMAGLUTIDE 1.34 MG/ML
INJECTION, SOLUTION SUBCUTANEOUS
COMMUNITY
Start: 2023-06-06

## 2023-06-19 RX ORDER — CHLORHEXIDINE GLUCONATE 0.12 MG/ML
RINSE ORAL
COMMUNITY
Start: 2023-04-10

## 2023-06-19 RX ORDER — ROSUVASTATIN CALCIUM 5 MG/1
TABLET, COATED ORAL
COMMUNITY
Start: 2022-12-27

## 2023-06-19 RX ORDER — MIDAZOLAM HYDROCHLORIDE 1 MG/ML
INJECTION INTRAMUSCULAR; INTRAVENOUS PRN
Status: DISCONTINUED | OUTPATIENT
Start: 2023-06-19 | End: 2023-06-19 | Stop reason: ALTCHOICE

## 2023-06-19 RX ORDER — SODIUM CHLORIDE, SODIUM LACTATE, POTASSIUM CHLORIDE, AND CALCIUM CHLORIDE .6; .31; .03; .02 G/100ML; G/100ML; G/100ML; G/100ML
IRRIGANT IRRIGATION ONCE
Status: COMPLETED | OUTPATIENT
Start: 2023-06-19 | End: 2023-06-19

## 2023-06-19 RX ORDER — AMOXICILLIN 500 MG/1
500 CAPSULE ORAL
COMMUNITY

## 2023-06-19 RX ORDER — NICOTINE POLACRILEX 2 MG
GUM BUCCAL
COMMUNITY

## 2023-06-19 RX ADMIN — ONDANSETRON 4 MG: 2 INJECTION INTRAMUSCULAR; INTRAVENOUS at 07:48

## 2023-06-19 RX ADMIN — SODIUM CHLORIDE, SODIUM LACTATE, POTASSIUM CHLORIDE, AND CALCIUM CHLORIDE: .6; .31; .03; .02 IRRIGANT IRRIGATION at 07:30

## 2023-06-19 ASSESSMENT — PAIN SCALES - WONG BAKER
WONGBAKER_NUMERICALRESPONSE: 0
WONGBAKER_NUMERICALRESPONSE: 0

## 2023-06-19 ASSESSMENT — PAIN - FUNCTIONAL ASSESSMENT
PAIN_FUNCTIONAL_ASSESSMENT: WONG-BAKER FACES
PAIN_FUNCTIONAL_ASSESSMENT: 0-10

## 2023-06-19 ASSESSMENT — PAIN SCALES - GENERAL
PAINLEVEL_OUTOF10: 0

## 2023-06-19 NOTE — PROGRESS NOTES
Ambulatory Surgery/Procedure Discharge Note    Vitals:    06/19/23 0850   BP: 117/89   Pulse: 86   Resp: 16   Temp:    SpO2: 94%       No intake/output data recorded. Restroom use offered before discharge. Yes    Pain assessment:  level of pain (1-10, 10 severe)  Pain Level: 0  Pt to Endoscopy recovery post Colonoscopy. Pt denies pain at this time. Pt denies nausea at this time, pt tolerating ice chips well. Discharge instructions given to pt's brother and he states understanding of these instructions. Pt and pt's brother state that pt is \"ready to go. \"       Patient discharged to home/self care.  Patient discharged via wheel chair by transporter to waiting family/S.O.       6/19/2023 8:59 AM

## 2023-06-19 NOTE — DISCHARGE INSTRUCTIONS
ENDOSCOPY DISCHARGE INSTRUCTIONS:    Call the physician that did your procedure for any questions or concerns:           DR. Bethanie Ascencio:  [phone]               ACTIVITY:    There are potential side effects to the medications used for sedation and anesthesia during your procedure. These include:  Dizziness or light-headedness, confusion or memory loss, delayed reaction times, loss of coordination, nausea and vomiting. Because of your increased risk for injury, we ask that you observe the following precautions: For the next 24 hours,  DO NOT operate an automobile, bicycle, motorcycle, , power tools or large equipment of any kind. Do not drink alcohol, sign any legal documents or make any legal decisions for 24 hours. Do not bend your head over lower than your heart. DO sit on the side of bed/couch awhile before getting up. Plan on bedrest or quiet relaxation today. You may resume normal activities in 24 hours. DIET:    Your first meal today should be light, avoiding spicy and fatty foods. If you tolerate this first meal,  then you may advance to your regular diet unless otherwise advised by your physician. NORMAL SYMPTOMS:  -Sore throat if youve had an EGD  -Gaseous discomfort    NOTIFY YOUR PHYSICIAN IF THESE SYMPTOMS OCCUR:  1. Fever (greater than 100)  5. Increased abdominal bloating  2. Severe pain    6. Excessive bleeding  3. Nausea and vomiting  7. Chest pain                                                                    4. Chills    8. Shortness of breath      ADDITIONAL INSTRUCTIONS:    Biopsy results: No Biopsies Obtained, Resume Coumadin Today     Educational Information:    Follow up appointment:                               Please review these discharge instructions this evening or tomorrow for   information you may have forgotten. We want to thank you for choosing the [facility] as your health care provider.  We always strive to provide you with

## 2023-06-19 NOTE — H&P
History and Physical / Pre-Sedation Assessment    James Dumont is a 47 y.o. female who presents today for colonoscopy procedure. 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    AML (acute myeloblastic leukemia) ([facility] 2001    DVT (deep venous thrombosis) (HCC)     Factor VIII deficiency (HCC)     History of blood transfusion     possibly with stem cell and surgeries    History of shingles     Hodgkin's disease ([facility] [address] 1999    [facility]     Hx of blood clots     Obesity, Class II, BMI 35-39.9, with comorbidity     SVT [facility]supra[facility] tachycardia) [facility]     1 occurance 2008       Medications:    Prior to Admission medications    Medication Sig Start Date End Date Taking? Authorizing Provider   B Complex Vitamins (VITAMIN B COMPLEX 100 IJ) Take 1 tablet by mouth daily 6/6/23  Yes Historical Provider, MD   Omega-3 Fatty Acids (FISH OIL) 1000 MG capsule Take 2 capsules by mouth 2 times daily 6/6/23  Yes Historical Provider, MD   rosuvastatin (CRESTOR) 5 MG tablet Take by mouth 12/27/22  Yes Historical Provider, MD   Semaglutide, 1 MG/DOSE, (OZEMPIC, 1 MG/DOSE,) 4 MG/3ML SOPN SMARTSIG:SUB-Q 6/6/23  Yes Historical Provider, MD   amoxicillin (AMOXIL) 500 MG capsule Take 1 capsule by mouth    Historical Provider, MD   Biotin 1 MG CAPS Biotin Oral        active    Historical Provider, MD   chlorhexidine (PERIDEX) 0.12 % solution RINSE WITH 1/2 OZ TWICE DAILY, MORNING AND EVENING AFTER BRUSHING. DO NOT SWALLOW. 4/10/23   Historical Provider, MD   VITAMIN D PO vitamin d    Historical Provider, MD   warfarin (COUMADIN) 5 MG tablet Take 1 tablet by mouth daily Or as directed by MD  Patient taking differently: Take 4 mg by mouth daily Or as directed by MD takes 5 mg on Sunday and wed 8/8/17   Deanna Davis MD   sertraline (ZOLOFT) 50 MG tablet TAKE ONE TABLET BY MOUTH DAILY 7/31/17   Deanna Davis MD       Allergies:    Allergies

## 2023-09-05 ENCOUNTER — APPOINTMENT (OUTPATIENT)
Dept: CT IMAGING | Age: 53
End: 2023-09-05
Payer: COMMERCIAL

## 2023-09-05 ENCOUNTER — HOSPITAL ENCOUNTER (OUTPATIENT)
Age: 53
Setting detail: OBSERVATION
Discharge: HOME | End: 2023-09-07
Attending: EMERGENCY MEDICINE | Admitting: ANESTHESIOLOGY
Payer: COMMERCIAL

## 2023-09-05 DIAGNOSIS — R07.9 CHEST PAIN, UNSPECIFIED TYPE: Primary | ICD-10-CM

## 2023-09-05 LAB
ANION GAP SERPL CALCULATED.3IONS-SCNC: 12 MMOL/L (ref 3–16)
BASOPHILS # BLD: 0.1 K/UL (ref 0–0.2)
BASOPHILS NFR BLD: 0.6 %
BUN SERPL-MCNC: 12 MG/DL (ref 7–20)
CALCIUM SERPL-MCNC: 9.7 MG/DL (ref 8.3–10.6)
CHLORIDE SERPL-SCNC: 102 MMOL/L (ref 99–110)
CO2 SERPL-SCNC: 23 MMOL/L (ref 21–32)
CREAT SERPL-MCNC: 0.6 MG/DL (ref 0.6–1.1)
DEPRECATED RDW RBC AUTO: 14.6 % (ref 12.4–15.4)
EKG ATRIAL RATE: 87 BPM
EKG DIAGNOSIS: NORMAL
EKG P AXIS: 61 DEGREES
EKG P-R INTERVAL: 180 MS
EKG Q-T INTERVAL: 396 MS
EKG QRS DURATION: 92 MS
EKG QTC CALCULATION (BAZETT): 476 MS
EKG R AXIS: 9 DEGREES
EKG T AXIS: 50 DEGREES
EKG VENTRICULAR RATE: 87 BPM
EOSINOPHIL # BLD: 0.1 K/UL (ref 0–0.6)
EOSINOPHIL NFR BLD: 1 %
GFR SERPLBLD CREATININE-BSD FMLA CKD-EPI: >60 ML/MIN/{1.73_M2}
GLUCOSE BLD-MCNC: 108 MG/DL (ref 70–99)
GLUCOSE BLD-MCNC: 118 MG/DL (ref 70–99)
GLUCOSE SERPL-MCNC: 91 MG/DL (ref 70–99)
HCT VFR BLD AUTO: 39.5 % (ref 36–48)
HGB BLD-MCNC: 13.2 G/DL (ref 12–16)
INR PPP: 2.85 (ref 0.84–1.16)
LYMPHOCYTES # BLD: 2.8 K/UL (ref 1–5.1)
LYMPHOCYTES NFR BLD: 30 %
MCH RBC QN AUTO: 29.6 PG (ref 26–34)
MCHC RBC AUTO-ENTMCNC: 33.5 G/DL (ref 31–36)
MCV RBC AUTO: 88.3 FL (ref 80–100)
MONOCYTES # BLD: 0.7 K/UL (ref 0–1.3)
MONOCYTES NFR BLD: 7.3 %
NEUTROPHILS # BLD: 5.7 K/UL (ref 1.7–7.7)
NEUTROPHILS NFR BLD: 61.1 %
NT-PROBNP SERPL-MCNC: 572 PG/ML (ref 0–124)
PERFORMED ON: ABNORMAL
PERFORMED ON: ABNORMAL
PLATELET # BLD AUTO: 237 K/UL (ref 135–450)
PMV BLD AUTO: 7.3 FL (ref 5–10.5)
POTASSIUM SERPL-SCNC: 4.1 MMOL/L (ref 3.5–5.1)
PROTHROMBIN TIME: 29.7 SEC (ref 11.5–14.8)
RBC # BLD AUTO: 4.47 M/UL (ref 4–5.2)
SODIUM SERPL-SCNC: 137 MMOL/L (ref 136–145)
TROPONIN, HIGH SENSITIVITY: 7 NG/L (ref 0–14)
TROPONIN, HIGH SENSITIVITY: 8 NG/L (ref 0–14)
TROPONIN, HIGH SENSITIVITY: <6 NG/L (ref 0–14)
WBC # BLD AUTO: 9.3 K/UL (ref 4–11)

## 2023-09-05 PROCEDURE — 6370000000 HC RX 637 (ALT 250 FOR IP): Performed by: ANESTHESIOLOGY

## 2023-09-05 PROCEDURE — G0378 HOSPITAL OBSERVATION PER HR: HCPCS

## 2023-09-05 PROCEDURE — 99285 EMERGENCY DEPT VISIT HI MDM: CPT

## 2023-09-05 PROCEDURE — 36415 COLL VENOUS BLD VENIPUNCTURE: CPT

## 2023-09-05 PROCEDURE — 85610 PROTHROMBIN TIME: CPT

## 2023-09-05 PROCEDURE — 2580000003 HC RX 258: Performed by: ANESTHESIOLOGY

## 2023-09-05 PROCEDURE — 83880 ASSAY OF NATRIURETIC PEPTIDE: CPT

## 2023-09-05 PROCEDURE — 85025 COMPLETE CBC W/AUTO DIFF WBC: CPT

## 2023-09-05 PROCEDURE — 6360000004 HC RX CONTRAST MEDICATION: Performed by: EMERGENCY MEDICINE

## 2023-09-05 PROCEDURE — 84484 ASSAY OF TROPONIN QUANT: CPT

## 2023-09-05 PROCEDURE — 6370000000 HC RX 637 (ALT 250 FOR IP): Performed by: EMERGENCY MEDICINE

## 2023-09-05 PROCEDURE — 80048 BASIC METABOLIC PNL TOTAL CA: CPT

## 2023-09-05 PROCEDURE — 93005 ELECTROCARDIOGRAM TRACING: CPT | Performed by: EMERGENCY MEDICINE

## 2023-09-05 PROCEDURE — 71275 CT ANGIOGRAPHY CHEST: CPT

## 2023-09-05 RX ORDER — ACETAMINOPHEN 325 MG/1
650 TABLET ORAL EVERY 6 HOURS PRN
Status: DISCONTINUED | OUTPATIENT
Start: 2023-09-05 | End: 2023-09-07 | Stop reason: HOSPADM

## 2023-09-05 RX ORDER — WARFARIN SODIUM 2 MG/1
4 TABLET ORAL DAILY
Status: DISCONTINUED | OUTPATIENT
Start: 2023-09-06 | End: 2023-09-07 | Stop reason: HOSPADM

## 2023-09-05 RX ORDER — ASPIRIN 325 MG
325 TABLET ORAL ONCE
Status: COMPLETED | OUTPATIENT
Start: 2023-09-05 | End: 2023-09-05

## 2023-09-05 RX ORDER — ASCORBIC ACID 1000 MG
TABLET ORAL
COMMUNITY

## 2023-09-05 RX ORDER — SODIUM CHLORIDE 0.9 % (FLUSH) 0.9 %
5-40 SYRINGE (ML) INJECTION EVERY 12 HOURS SCHEDULED
Status: DISCONTINUED | OUTPATIENT
Start: 2023-09-05 | End: 2023-09-07 | Stop reason: HOSPADM

## 2023-09-05 RX ORDER — SODIUM CHLORIDE 9 MG/ML
INJECTION, SOLUTION INTRAVENOUS CONTINUOUS
Status: ACTIVE | OUTPATIENT
Start: 2023-09-05 | End: 2023-09-06

## 2023-09-05 RX ORDER — INSULIN LISPRO 100 [IU]/ML
0-4 INJECTION, SOLUTION INTRAVENOUS; SUBCUTANEOUS
Status: DISCONTINUED | OUTPATIENT
Start: 2023-09-05 | End: 2023-09-07 | Stop reason: HOSPADM

## 2023-09-05 RX ORDER — ROSUVASTATIN CALCIUM 20 MG/1
20 TABLET, COATED ORAL NIGHTLY
Status: DISCONTINUED | OUTPATIENT
Start: 2023-09-05 | End: 2023-09-07 | Stop reason: HOSPADM

## 2023-09-05 RX ORDER — POLYETHYLENE GLYCOL 3350 17 G/17G
17 POWDER, FOR SOLUTION ORAL DAILY PRN
Status: DISCONTINUED | OUTPATIENT
Start: 2023-09-05 | End: 2023-09-07 | Stop reason: HOSPADM

## 2023-09-05 RX ORDER — OMEGA-3-ACID ETHYL ESTERS 1 G/1
CAPSULE, LIQUID FILLED ORAL
COMMUNITY
Start: 2023-06-06

## 2023-09-05 RX ORDER — ACETAMINOPHEN 650 MG/1
650 SUPPOSITORY RECTAL EVERY 6 HOURS PRN
Status: DISCONTINUED | OUTPATIENT
Start: 2023-09-05 | End: 2023-09-07 | Stop reason: HOSPADM

## 2023-09-05 RX ORDER — SODIUM CHLORIDE 9 MG/ML
INJECTION, SOLUTION INTRAVENOUS PRN
Status: DISCONTINUED | OUTPATIENT
Start: 2023-09-05 | End: 2023-09-07 | Stop reason: HOSPADM

## 2023-09-05 RX ORDER — ONDANSETRON 4 MG/1
4 TABLET, ORALLY DISINTEGRATING ORAL EVERY 8 HOURS PRN
Status: DISCONTINUED | OUTPATIENT
Start: 2023-09-05 | End: 2023-09-07 | Stop reason: HOSPADM

## 2023-09-05 RX ORDER — ONDANSETRON 2 MG/ML
4 INJECTION INTRAMUSCULAR; INTRAVENOUS EVERY 6 HOURS PRN
Status: DISCONTINUED | OUTPATIENT
Start: 2023-09-05 | End: 2023-09-07 | Stop reason: HOSPADM

## 2023-09-05 RX ORDER — INSULIN LISPRO 100 [IU]/ML
0-4 INJECTION, SOLUTION INTRAVENOUS; SUBCUTANEOUS NIGHTLY
Status: DISCONTINUED | OUTPATIENT
Start: 2023-09-05 | End: 2023-09-07 | Stop reason: HOSPADM

## 2023-09-05 RX ORDER — SODIUM CHLORIDE 0.9 % (FLUSH) 0.9 %
5-40 SYRINGE (ML) INJECTION PRN
Status: DISCONTINUED | OUTPATIENT
Start: 2023-09-05 | End: 2023-09-07 | Stop reason: HOSPADM

## 2023-09-05 RX ADMIN — METOPROLOL TARTRATE 25 MG: 25 TABLET, FILM COATED ORAL at 22:57

## 2023-09-05 RX ADMIN — SODIUM CHLORIDE: 9 INJECTION, SOLUTION INTRAVENOUS at 17:04

## 2023-09-05 RX ADMIN — ASPIRIN 325 MG: 325 TABLET ORAL at 14:35

## 2023-09-05 RX ADMIN — IOPAMIDOL 75 ML: 755 INJECTION, SOLUTION INTRAVENOUS at 13:29

## 2023-09-05 ASSESSMENT — ENCOUNTER SYMPTOMS: CHEST TIGHTNESS: 1

## 2023-09-05 NOTE — CONSULTS
04/12/2017 12:06 PM    HGB 13.2 09/05/2023 12:33 PM    HCT 39.5 09/05/2023 12:33 PM     09/05/2023 12:33 PM    MCV 88.3 09/05/2023 12:33 PM    MCH 29.6 09/05/2023 12:33 PM    MCHC 33.5 09/05/2023 12:33 PM    RDW 14.6 09/05/2023 12:33 PM    LYMPHOPCT 30.0 09/05/2023 12:33 PM    LYMPHOPCT 28.9 04/12/2017 12:06 PM    MONOPCT 7.3 09/05/2023 12:33 PM    BASOPCT 0.6 09/05/2023 12:33 PM    MONOSABS 0.7 09/05/2023 12:33 PM    LYMPHSABS 2.8 09/05/2023 12:33 PM    EOSABS 0.1 09/05/2023 12:33 PM    BASOSABS 0.1 09/05/2023 12:33 PM     BMP:    Lab Results   Component Value Date/Time     09/05/2023 12:33 PM    K 4.1 09/05/2023 12:33 PM     09/05/2023 12:33 PM    CO2 23 09/05/2023 12:33 PM    BUN 12 09/05/2023 12:33 PM    LABALBU 3.7 11/19/2018 09:21 AM    CREATININE 0.6 09/05/2023 12:33 PM    CALCIUM 9.7 09/05/2023 12:33 PM    GFRAA >60 11/19/2018 09:21 AM    LABGLOM >60 09/05/2023 12:33 PM     Uric Acid:  No components found for: URIC  PT/INR:    Lab Results   Component Value Date/Time    PROTIME 29.7 09/05/2023 12:33 PM    INR 2.85 09/05/2023 12:33 PM     Last 3 Troponin:    Lab Results   Component Value Date/Time    TROPONINI <0.01 11/19/2018 09:21 AM     FLP:    Lab Results   Component Value Date/Time    TRIG 96 07/19/2010 09:21 AM    HDL 49 07/19/2010 09:21 AM    LDLDIRECT 120 07/19/2010 09:21 AM   I sensitivity troponin less than 6 and 7    EKG: On 9/5/2023 sinus rhythm 87/min. Essentially normal study. Carrie Buckley echocardiogramSummary 8/19/2018   Normal left ventricle size, wall thickness, and systolic function with an   estimated ejection fraction of 55-60%. No regional wall motion abnormalities   are seen. Diastolic filling parameters suggests normal diastolic function. Trivial tricuspid regurgitation.  Estimated pulmonary artery systolic   pressure is normal at 24 mmHg assuming a right atrial pressure of 3 mmHg  Assessment/ Plan     Patient Active Problem List    Diagnosis Date Noted    Chest pain 09/05/2023    Closed fracture of lower end of right radius with routine healing 07/10/2018    History of shingles     Obesity, Class II, BMI 35-39.9, with comorbidity     Pulmonary mycobacterial infection ([address]) 07/20/2017    Qkvmfngca-Yluzzvq-Nghawv disease 10/18/2016    Vaginal atrophy 03/08/2016    Climacteric 03/08/2016    Post-menopausal bleeding 03/03/2016    Thickened endometrium 02/16/2016    Endometrial hyperplasia 02/16/2016    History of bone marrow transplant ([address]) 02/03/2016    Deep venous thrombosis of right upper extremity ([address]) 02/03/2016    History of chemotherapy 02/03/2016    Cobalamin deficiency 12/17/2015    Hyperlipidemia 12/17/2015    Vitamin D deficiency 12/17/2015    Acute myeloid leukemia in remission ([address]) 02/20/2015    Long term current use of anticoagulant therapy 02/20/2015    Hodgkin's disease ([address]) 01/23/2015    Carpal tunnel syndrome 09/20/2012    Lesion of ulnar nerve 09/20/2012    Hypercoagulable state ([address]) 03/13/2012   Chest pressure discomfort atypical for cardiac or coronary disease. The troponin levels are unremarkable. EKG is unremarkable. We will plan to obtain a stress nuclear study. Current echocardiograph particularly in view of her significant hematology oncology history. Previous pericardial effusions requiring pericardiocentesis and subsequently pericardial window. Evaluation for post radiation scar tissue    #1 echocardiogram  #2 repeat the EKG  #3 complete enzyme series  #4 stress nuclear study  History of pericarditis and effusion  History of pericardial window placement        Thanks for allowing me the opportunity  to participate in the evaluation and care of your patients.  Please call if we can assist further [phone]    This chart was likely completed using voice recognition technology and may contain unintended grammatical , phraseology,and/or punctuation errors  Viraj Mosqueda MD , [facility]  2/9/58131:06 PM

## 2023-09-05 NOTE — CONSULTS
Clinical Pharmacy Progress Note    Warfarin - Management by Pharmacy    Consult Date(s): 9/5/23  Consulting Provider(s): Honey Victor    Assessment / Plan  Clotting abnormality, Hx DVT/PE - Warfarin  Goal INR: 2 - 3  Concurrent Anticoagulants / Antiplatelets: none  Interactions: No significant interactions noted. Current Regimen / Plan:   Home dose: warfarin 4 mg daily  INR today 2.85  Will continue home dose  Will monitor pt's clinical status and INR daily, and make dose adjustments as needed. Thank you for consulting pharmacy,    Yohana Mcclelland, PharmD, [facility] 9/5/2023 5:08 PM        Interval update:  initiation    Subjective/Objective: Nael Castro is a 47 y.o. female with a PMHx significant for lukemia, DVT, HLD, hypercoaguable state  who is admitted with chest pain. Pharmacy is consulted to dose warfarin.     Ht Readings from Last 1 Encounters:   09/05/23 5' 7\" (1.702 m)     Wt Readings from Last 1 Encounters:   09/05/23 223 lb 14.4 oz (101.6 kg)     Prior / Home Warfarin Regimen:  Warfarin 4 mg daily    Date INR Warfarin   9/5 2.85 4 mg                      Recent Labs     09/05/23  1233   INR 2.85*   HGB 13.2      CREATININE 0.6

## 2023-09-05 NOTE — PLAN OF CARE
Problem: ABCDS Injury Assessment  Goal: Absence of physical injury  Outcome: Progressing  Note: Absence of physical injury. No current physical injury noted. Bed in the lowest position, wheel locked. Call light within reach.

## 2023-09-05 NOTE — ED NOTES
ED TO INPATIENT SBAR HANDOFF    Patient Name: Eliana Huff   :  1970  47 y.o. MRN:  [id number]  Preferred Name    ED Room #:  U45/S69-36  Family/Caregiver Present yes   Restraints no   Sitter no   Sepsis Risk Score Sepsis Risk Score: 1.44    Situation  Code Status: Prior No additional code details. Allergies: Rifabutin and Morphine  Weight: Patient Vitals for the past 96 hrs (Last 3 readings):   Weight   23 1234 229 lb (103.9 kg)     Arrived from: home  Chief Complaint:   Chief Complaint   Patient presents with    Chest Pain     Started approx 1100/1130, described as tightness across midsternal area of chest. Pt reports dizziness, upper back pain and L shoulder pain though pt has hx of L sided rotator cuff tear      Hospital Problem/Diagnosis:  Principal Problem:    Chest pain  Resolved Problems:    * No resolved hospital problems. *    Imaging:   CTA PULMONARY W CONTRAST   Final Result      No pulmonary embolism seen. No acute parenchymal consolidation or effusion.            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

## 2023-09-06 DIAGNOSIS — R07.9 CHEST PAIN, UNSPECIFIED TYPE: Primary | ICD-10-CM

## 2023-09-06 DIAGNOSIS — I31.9 PERICARDITIS WITH EFFUSION: ICD-10-CM

## 2023-09-06 DIAGNOSIS — Z98.890 S/P PERICARDIAL WINDOW CREATION: ICD-10-CM

## 2023-09-06 LAB
ANION GAP SERPL CALCULATED.3IONS-SCNC: 11 MMOL/L (ref 3–16)
BASOPHILS # BLD: 0.1 K/UL (ref 0–0.2)
BASOPHILS NFR BLD: 1.1 %
BUN SERPL-MCNC: 11 MG/DL (ref 7–20)
CALCIUM SERPL-MCNC: 8.9 MG/DL (ref 8.3–10.6)
CHLORIDE SERPL-SCNC: 102 MMOL/L (ref 99–110)
CO2 SERPL-SCNC: 22 MMOL/L (ref 21–32)
CREAT SERPL-MCNC: 0.7 MG/DL (ref 0.6–1.1)
DEPRECATED RDW RBC AUTO: 14.8 % (ref 12.4–15.4)
EOSINOPHIL # BLD: 0.2 K/UL (ref 0–0.6)
EOSINOPHIL NFR BLD: 2 %
GFR SERPLBLD CREATININE-BSD FMLA CKD-EPI: >60 ML/MIN/{1.73_M2}
GLUCOSE BLD-MCNC: 89 MG/DL (ref 70–99)
GLUCOSE BLD-MCNC: 93 MG/DL (ref 70–99)
GLUCOSE BLD-MCNC: 94 MG/DL (ref 70–99)
GLUCOSE BLD-MCNC: 99 MG/DL (ref 70–99)
GLUCOSE SERPL-MCNC: 98 MG/DL (ref 70–99)
HCT VFR BLD AUTO: 35.3 % (ref 36–48)
HGB BLD-MCNC: 12.5 G/DL (ref 12–16)
LYMPHOCYTES # BLD: 2.8 K/UL (ref 1–5.1)
LYMPHOCYTES NFR BLD: 32.6 %
MAGNESIUM SERPL-MCNC: 2.2 MG/DL (ref 1.8–2.4)
MCH RBC QN AUTO: 30.5 PG (ref 26–34)
MCHC RBC AUTO-ENTMCNC: 35.5 G/DL (ref 31–36)
MCV RBC AUTO: 86 FL (ref 80–100)
MONOCYTES # BLD: 0.8 K/UL (ref 0–1.3)
MONOCYTES NFR BLD: 8.8 %
NEUTROPHILS # BLD: 4.8 K/UL (ref 1.7–7.7)
NEUTROPHILS NFR BLD: 55.5 %
PERFORMED ON: NORMAL
PLATELET # BLD AUTO: 202 K/UL (ref 135–450)
PMV BLD AUTO: 7.4 FL (ref 5–10.5)
POTASSIUM SERPL-SCNC: 4 MMOL/L (ref 3.5–5.1)
RBC # BLD AUTO: 4.1 M/UL (ref 4–5.2)
SODIUM SERPL-SCNC: 135 MMOL/L (ref 136–145)
TROPONIN, HIGH SENSITIVITY: <6 NG/L (ref 0–14)
WBC # BLD AUTO: 8.7 K/UL (ref 4–11)

## 2023-09-06 PROCEDURE — 6370000000 HC RX 637 (ALT 250 FOR IP): Performed by: ANESTHESIOLOGY

## 2023-09-06 PROCEDURE — 3430000000 HC RX DIAGNOSTIC RADIOPHARMACEUTICAL: Performed by: INTERNAL MEDICINE

## 2023-09-06 PROCEDURE — 80048 BASIC METABOLIC PNL TOTAL CA: CPT

## 2023-09-06 PROCEDURE — G0378 HOSPITAL OBSERVATION PER HR: HCPCS

## 2023-09-06 PROCEDURE — 85025 COMPLETE CBC W/AUTO DIFF WBC: CPT

## 2023-09-06 PROCEDURE — A9502 TC99M TETROFOSMIN: HCPCS | Performed by: INTERNAL MEDICINE

## 2023-09-06 PROCEDURE — 83735 ASSAY OF MAGNESIUM: CPT

## 2023-09-06 PROCEDURE — 99233 SBSQ HOSP IP/OBS HIGH 50: CPT | Performed by: NURSE PRACTITIONER

## 2023-09-06 PROCEDURE — 99232 SBSQ HOSP IP/OBS MODERATE 35: CPT | Performed by: INTERNAL MEDICINE

## 2023-09-06 PROCEDURE — 82306 VITAMIN D 25 HYDROXY: CPT

## 2023-09-06 PROCEDURE — 84443 ASSAY THYROID STIM HORMONE: CPT

## 2023-09-06 PROCEDURE — 80061 LIPID PANEL: CPT

## 2023-09-06 PROCEDURE — 84484 ASSAY OF TROPONIN QUANT: CPT

## 2023-09-06 PROCEDURE — 2580000003 HC RX 258: Performed by: ANESTHESIOLOGY

## 2023-09-06 PROCEDURE — 93017 CV STRESS TEST TRACING ONLY: CPT

## 2023-09-06 PROCEDURE — 78452 HT MUSCLE IMAGE SPECT MULT: CPT

## 2023-09-06 PROCEDURE — 6360000002 HC RX W HCPCS: Performed by: INTERNAL MEDICINE

## 2023-09-06 PROCEDURE — 36415 COLL VENOUS BLD VENIPUNCTURE: CPT

## 2023-09-06 RX ORDER — REGADENOSON 0.08 MG/ML
0.4 INJECTION, SOLUTION INTRAVENOUS
Status: COMPLETED | OUTPATIENT
Start: 2023-09-06 | End: 2023-09-06

## 2023-09-06 RX ORDER — DEXTROSE MONOHYDRATE 100 MG/ML
INJECTION, SOLUTION INTRAVENOUS CONTINUOUS PRN
Status: DISCONTINUED | OUTPATIENT
Start: 2023-09-06 | End: 2023-09-07 | Stop reason: HOSPADM

## 2023-09-06 RX ADMIN — TETROFOSMIN 30 MILLICURIE: 1.38 INJECTION, POWDER, LYOPHILIZED, FOR SOLUTION INTRAVENOUS at 15:05

## 2023-09-06 RX ADMIN — ACETAMINOPHEN 650 MG: 325 TABLET ORAL at 11:49

## 2023-09-06 RX ADMIN — WARFARIN SODIUM 4 MG: 2 TABLET ORAL at 18:03

## 2023-09-06 RX ADMIN — ROSUVASTATIN CALCIUM 20 MG: 20 TABLET, FILM COATED ORAL at 19:27

## 2023-09-06 RX ADMIN — REGADENOSON 0.4 MG: 0.08 INJECTION, SOLUTION INTRAVENOUS at 15:05

## 2023-09-06 RX ADMIN — TETROFOSMIN 10 MILLICURIE: 1.38 INJECTION, POWDER, LYOPHILIZED, FOR SOLUTION INTRAVENOUS at 13:50

## 2023-09-06 RX ADMIN — SODIUM CHLORIDE, PRESERVATIVE FREE 10 ML: 5 INJECTION INTRAVENOUS at 19:30

## 2023-09-06 RX ADMIN — SERTRALINE HYDROCHLORIDE 50 MG: 50 TABLET ORAL at 08:54

## 2023-09-06 RX ADMIN — SODIUM CHLORIDE, PRESERVATIVE FREE 10 ML: 5 INJECTION INTRAVENOUS at 09:00

## 2023-09-06 RX ADMIN — METOPROLOL TARTRATE 25 MG: 25 TABLET, FILM COATED ORAL at 19:27

## 2023-09-06 ASSESSMENT — ENCOUNTER SYMPTOMS
SINUS PAIN: 0
SORE THROAT: 0
SINUS PRESSURE: 0
COLOR CHANGE: 0
BACK PAIN: 0
WHEEZING: 0
SHORTNESS OF BREATH: 0
VOMITING: 0
CONSTIPATION: 0
ABDOMINAL PAIN: 0
COUGH: 0
NAUSEA: 0
DIARRHEA: 0

## 2023-09-06 ASSESSMENT — PAIN DESCRIPTION - LOCATION: LOCATION: HEAD

## 2023-09-06 NOTE — CARE COORDINATION
Patient here for SOB and CP. Patient is from home alone. Independent prior to this hospitalization. Patient works full time and will need a note for work. Patient lives in a one level home and states that she has no needs at this time for discharge. Case Management will follow for any changes that may require discharge planning. Otherwise, no needs.  Electronically signed by Ignacio Lincoln RN on 9/6/2023 at 1:51 PM

## 2023-09-06 NOTE — PLAN OF CARE
D: No c/o chest pain amb to BR independently with steady gait. C/o some tightness at times that radiates to L-neck. Stated has hx of L-rotator cuff problems and has been under a lot of stress moving Elderly parents into ECF and not sure chest tightness could be from that. Non skid socks on for safety. A: Cont to monitor during hourly rounds    Problem: Pain  Goal: Verbalizes/displays adequate comfort level or baseline comfort level  Outcome: Progressing     Problem: ABCDS Injury Assessment  Goal: Absence of physical injury  9/5/2023 1828 by Sandra Jorgensen RN  Outcome: Progressing  Note: Absence of physical injury. No current physical injury noted. Bed in the lowest position, wheel locked. Call light within reach.

## 2023-09-07 VITALS
HEART RATE: 84 BPM | RESPIRATION RATE: 16 BRPM | HEIGHT: 67 IN | TEMPERATURE: 98.4 F | OXYGEN SATURATION: 94 % | BODY MASS INDEX: 34.97 KG/M2 | WEIGHT: 222.8 LBS | DIASTOLIC BLOOD PRESSURE: 76 MMHG | SYSTOLIC BLOOD PRESSURE: 112 MMHG

## 2023-09-07 LAB
25(OH)D3 SERPL-MCNC: 55.3 NG/ML
ANION GAP SERPL CALCULATED.3IONS-SCNC: 10 MMOL/L (ref 3–16)
BASOPHILS # BLD: 0.1 K/UL (ref 0–0.2)
BASOPHILS NFR BLD: 0.8 %
BUN SERPL-MCNC: 12 MG/DL (ref 7–20)
CALCIUM SERPL-MCNC: 9.1 MG/DL (ref 8.3–10.6)
CHLORIDE SERPL-SCNC: 107 MMOL/L (ref 99–110)
CHOLEST SERPL-MCNC: 154 MG/DL (ref 0–199)
CO2 SERPL-SCNC: 21 MMOL/L (ref 21–32)
CREAT SERPL-MCNC: 0.6 MG/DL (ref 0.6–1.1)
DEPRECATED RDW RBC AUTO: 14.5 % (ref 12.4–15.4)
EOSINOPHIL # BLD: 0.1 K/UL (ref 0–0.6)
EOSINOPHIL NFR BLD: 1.5 %
GFR SERPLBLD CREATININE-BSD FMLA CKD-EPI: >60 ML/MIN/{1.73_M2}
GLUCOSE BLD-MCNC: 102 MG/DL (ref 70–99)
GLUCOSE BLD-MCNC: 98 MG/DL (ref 70–99)
GLUCOSE SERPL-MCNC: 94 MG/DL (ref 70–99)
HCT VFR BLD AUTO: 37 % (ref 36–48)
HDLC SERPL-MCNC: 56 MG/DL (ref 40–60)
HGB BLD-MCNC: 12.7 G/DL (ref 12–16)
INR PPP: 2.54 (ref 0.84–1.16)
LDLC SERPL CALC-MCNC: 78 MG/DL
LYMPHOCYTES # BLD: 1.7 K/UL (ref 1–5.1)
LYMPHOCYTES NFR BLD: 21.9 %
MCH RBC QN AUTO: 29.8 PG (ref 26–34)
MCHC RBC AUTO-ENTMCNC: 34.3 G/DL (ref 31–36)
MCV RBC AUTO: 86.9 FL (ref 80–100)
MONOCYTES # BLD: 0.6 K/UL (ref 0–1.3)
MONOCYTES NFR BLD: 7.5 %
NEUTROPHILS # BLD: 5.3 K/UL (ref 1.7–7.7)
NEUTROPHILS NFR BLD: 68.3 %
PERFORMED ON: ABNORMAL
PERFORMED ON: NORMAL
PLATELET # BLD AUTO: 226 K/UL (ref 135–450)
PMV BLD AUTO: 8 FL (ref 5–10.5)
POTASSIUM SERPL-SCNC: 4.7 MMOL/L (ref 3.5–5.1)
PROTHROMBIN TIME: 27.2 SEC (ref 11.5–14.8)
RBC # BLD AUTO: 4.26 M/UL (ref 4–5.2)
SODIUM SERPL-SCNC: 138 MMOL/L (ref 136–145)
TRIGL SERPL-MCNC: 100 MG/DL (ref 0–150)
TSH SERPL DL<=0.005 MIU/L-ACNC: 0.62 UIU/ML (ref 0.27–4.2)
VLDLC SERPL CALC-MCNC: 20 MG/DL
WBC # BLD AUTO: 7.7 K/UL (ref 4–11)

## 2023-09-07 PROCEDURE — C8929 TTE W OR WO FOL WCON,DOPPLER: HCPCS

## 2023-09-07 PROCEDURE — 2580000003 HC RX 258: Performed by: ANESTHESIOLOGY

## 2023-09-07 PROCEDURE — 36415 COLL VENOUS BLD VENIPUNCTURE: CPT

## 2023-09-07 PROCEDURE — G0378 HOSPITAL OBSERVATION PER HR: HCPCS

## 2023-09-07 PROCEDURE — 6360000004 HC RX CONTRAST MEDICATION: Performed by: INTERNAL MEDICINE

## 2023-09-07 PROCEDURE — 6370000000 HC RX 637 (ALT 250 FOR IP): Performed by: ANESTHESIOLOGY

## 2023-09-07 PROCEDURE — 85610 PROTHROMBIN TIME: CPT

## 2023-09-07 PROCEDURE — 80048 BASIC METABOLIC PNL TOTAL CA: CPT

## 2023-09-07 PROCEDURE — 85025 COMPLETE CBC W/AUTO DIFF WBC: CPT

## 2023-09-07 RX ORDER — WARFARIN SODIUM 4 MG/1
4 TABLET ORAL DAILY
COMMUNITY

## 2023-09-07 RX ADMIN — SERTRALINE HYDROCHLORIDE 50 MG: 50 TABLET ORAL at 08:36

## 2023-09-07 RX ADMIN — SODIUM CHLORIDE, PRESERVATIVE FREE 10 ML: 5 INJECTION INTRAVENOUS at 08:36

## 2023-09-07 RX ADMIN — METOPROLOL TARTRATE 25 MG: 25 TABLET, FILM COATED ORAL at 08:36

## 2023-09-07 RX ADMIN — PERFLUTREN 1.5 ML: 6.52 INJECTION, SUSPENSION INTRAVENOUS at 10:18

## 2023-09-07 NOTE — CARE COORDINATION
Case Management Assessment            Discharge Note                    Date / Time of Note: 9/7/2023 1:36 PM                  Discharge Note Completed by: Kelvin Singh RN    Patient Name: Carry Found   YOB: 1970  Diagnosis: Chest pain [R07.9]  Chest pain, unspecified type [R07.9]   Date / Time: 9/5/2023 12:14 PM    Current PCP: Shayna Gregory MD  Clinic patient: No    Hospitalization in the last 30 days: No       Advance Directives:  Code Status: Full Code  [place] DNR form completed and on chart: No    Financial:  Payor: Candy Bowser / Plan: Jerson Cardenas PPO / Product Type: *No Product type* /      Pharmacy:    [facility] [phone] - [phone] [phone] [address] [phone] [address] [phone] - F [phone]  [address] [place] [address]  Phone: [phone] Fax: [phone]    [facility]/pharmacy [address],[place] [address] [phone] Aleshajose luis Keith [phone]  [facility] [address] [id number]  Phone: [phone] Fax: [phone]      Assistance purchasing medications?: Potential Assistance Purchasing Medications: No  Assistance provided by Case Management: None at this time    Does patient want to participate in local refill/ meds to beds program?: Yes    Meds To Beds General Rules:  1. Can ONLY be done Monday- Friday between 8:30am-5pm  2. Prescription(s) must be in pharmacy by 3pm to be filled same day  3. Copy of patient's insurance/ prescription drug card and patient face sheet must be sent along with the prescription(s)  4. Cost of Rx cannot be added to hospital bill. If financial assistance is needed, please contact unit  or ;  or  CANNOT provide pharmacy voucher for patients co-pays  5.  Patients can then  the prescription on their way out of the hospital at discharge, or pharmacy can deliver to the bedside if staff is available. (payment due at time of pick-up or delivery - cash, check, or card accepted)     Able to Kenna Wilson RN  [facility]  Case Management Department  Ph: [phone]

## 2023-09-07 NOTE — PLAN OF CARE
Problem: Safety - Adult  Goal: Free from fall injury  Outcome: Progressing   PT remained safe throughout the night

## 2023-09-07 NOTE — DISCHARGE SUMMARY
Magdalene Messina MD   Ordering Physician Ernestine Mendoza MD  Procedure Procedure Type:   Nuclear Stress Test:Pharmacological, NM MYOCARDIAL SPECT REST EXERCISE OR  RX   Study location: [facility] - Nuclear Medicine   Indications: Chest pain. Hospital Status: Inpatient. Height: 57 inches Weight: 221 pounds  Conclusions   Summary  Nondiagnostic Lexiscan ecg. Sensitivity is impaired by breast attenuation. There is normal isotope uptake at stress and rest. There is no evidence of  myocardial ischemia or scar. Normal LV function with ejection fraction of 75 %. Overall findings represent a low risk scan. Stress Protocols   Resting HR:83 bpm  Resting BP:133/95 mmHg  Stress Protocol:Pharmacologic - Lexiscan's  Peak HR:94 bpm                   HR/BP product:54951  Peak BP:158/86 mmHg  Predicted HR: 168 bpm  % of predicted HR: 56  Test duration: 1 min  Reason for termination:Completed   Symptoms  No chest pain. Complications  Procedure complication was none. Imaging Protocols   - One Day   Rest                          Stress   Isotope:Myoview/Tetrofosmin   Isotope: Myoview/Tetrofosmin  Isotope dose:11.9 mCi         Isotope dose:34.9 mCi  Administration Route:I.V.      Administration Route:I.V.  Date:09/06/2023 00:00         Date:09/06/2023 00:00                                 Technique:      Gated  Imaging Results   Applied corrections   - Attenuation correction  applied   - Scatter correction applied     Stress ejection    Ejection fraction:75 %    EDV :60 ml    ESV :15 ml    Stroke volume :45 ml    LV mass :106 gr  Medical History  Signatures   ------------------------------------------------------------------  Electronically signed by Magdalene Messina MD (Interpreting  physician) on 09/06/2023 at 17:17  ------------------------------------------------------------------        CBC:   Recent Labs     09/05/23  1233 09/06/23  0244 09/07/23  0809   WBC 9.3 8.7 7.7

## 2023-09-07 NOTE — PLAN OF CARE
Problem: Pain  Goal: Verbalizes/displays adequate comfort level or baseline comfort level  Outcome: Progressing  Flowsheets (Taken 9/7/2023 0726)  Verbalizes/displays adequate comfort level or baseline comfort level:   Encourage patient to monitor pain and request assistance   Assess pain using appropriate pain scale   Administer analgesics based on type and severity of pain and evaluate response  Note: Pain level assessed and PRN medication made available when needed for breakthrough pain. Problem: Safety - Adult  Goal: Free from fall injury  9/7/2023 0726 by Nina Torres RN  Outcome: Progressing  Flowsheets (Taken 9/7/2023 0726)  Free From Fall Injury:   Instruct family/caregiver on patient safety   Based on caregiver fall risk screen, instruct family/caregiver to ask for assistance with transferring infant if caregiver noted to have fall risk factors  Note: Patient A/O x4 and independent in the room. Call light usage discussed and room is free of clutter at this time.   9/7/2023 0151 by Juanita Syed RN  Outcome: Progressing

## 2023-09-07 NOTE — CARE COORDINATION
CM  following for  d/c planning:    Patient  from  home  Indep at  baseline, with  Mobility  and  ADL's  no current  services  or  DME. Plans to return home at  d/c  . Once  medically cleared      - Pt is not at high risk for readmission, has not recently been admitted, and there is no active consult for Case Management services. -  will sign off on this pt at this time. If needs arise, please consult Case Management services. -  Risk of Readmission Score   NA  % Observation Status. Electronically signed by Juan Dale RN on 9/7/2023 at 11:26 AM       Juan Dale  RN Case Manager  [facility]  [address] [place], [place] [address] [place] [address].   [facility] [address] [phone]  Fax [phone]

## 2025-06-02 ENCOUNTER — PATIENT MESSAGE (OUTPATIENT)
Dept: PULMONOLOGY | Age: 55
End: 2025-06-02

## 2025-06-03 NOTE — TELEPHONE ENCOUNTER
Called Clementina, offered new patient appt to discuss report. Appt offered was past closing date-     I suggested that she ask her pcp their opinion

## (undated) DEVICE — FORCEPS BX L240CM JAW DIA2.8MM L CAP W/ NDL MIC MESH TOOTH

## (undated) DEVICE — CANNULA SAMP CO2 AD GRN 7FT CO2 AND 7FT O2 TBNG UNIV CONN